# Patient Record
Sex: FEMALE | Race: WHITE | NOT HISPANIC OR LATINO | Employment: PART TIME | URBAN - METROPOLITAN AREA
[De-identification: names, ages, dates, MRNs, and addresses within clinical notes are randomized per-mention and may not be internally consistent; named-entity substitution may affect disease eponyms.]

---

## 2024-04-25 ENCOUNTER — OFFICE VISIT (OUTPATIENT)
Dept: PRIMARY CARE | Facility: CLINIC | Age: 49
End: 2024-04-25
Payer: COMMERCIAL

## 2024-04-25 VITALS
DIASTOLIC BLOOD PRESSURE: 82 MMHG | OXYGEN SATURATION: 96 % | SYSTOLIC BLOOD PRESSURE: 124 MMHG | HEART RATE: 76 BPM | WEIGHT: 195 LBS

## 2024-04-25 DIAGNOSIS — T78.40XA ALLERGY, INITIAL ENCOUNTER: ICD-10-CM

## 2024-04-25 DIAGNOSIS — Z12.31 SCREENING MAMMOGRAM, ENCOUNTER FOR: ICD-10-CM

## 2024-04-25 DIAGNOSIS — Z01.419 ENCOUNTER FOR GYNECOLOGICAL EXAMINATION: ICD-10-CM

## 2024-04-25 DIAGNOSIS — Z00.00 ENCOUNTER FOR ANNUAL PHYSICAL EXAM: Primary | ICD-10-CM

## 2024-04-25 DIAGNOSIS — Z86.19 HISTORY OF EPSTEIN-BARR VIRUS INFECTION: ICD-10-CM

## 2024-04-25 DIAGNOSIS — R06.02 SHORTNESS OF BREATH: ICD-10-CM

## 2024-04-25 DIAGNOSIS — G47.30 SLEEP APNEA, UNSPECIFIED TYPE: ICD-10-CM

## 2024-04-25 DIAGNOSIS — M35.9 AUTOIMMUNE DISEASE (MULTI): ICD-10-CM

## 2024-04-25 DIAGNOSIS — Z12.11 ENCOUNTER FOR SCREENING COLONOSCOPY: ICD-10-CM

## 2024-04-25 DIAGNOSIS — R07.89 OTHER CHEST PAIN: ICD-10-CM

## 2024-04-25 DIAGNOSIS — R10.9 ABDOMINAL PAIN, UNSPECIFIED ABDOMINAL LOCATION: ICD-10-CM

## 2024-04-25 PROCEDURE — 1036F TOBACCO NON-USER: CPT | Performed by: NURSE PRACTITIONER

## 2024-04-25 PROCEDURE — 99204 OFFICE O/P NEW MOD 45 MIN: CPT | Performed by: NURSE PRACTITIONER

## 2024-04-25 ASSESSMENT — ENCOUNTER SYMPTOMS
ACTIVITY CHANGE: 1
CONFUSION: 0
PSYCHIATRIC NEGATIVE: 1
EYES NEGATIVE: 1
JOINT SWELLING: 1
WOUND: 0
NERVOUS/ANXIOUS: 0
APPETITE CHANGE: 1
CHEST TIGHTNESS: 1
LIGHT-HEADEDNESS: 0
FATIGUE: 1
DIZZINESS: 0
NEUROLOGICAL NEGATIVE: 1
PALPITATIONS: 0
POLYPHAGIA: 0
SHORTNESS OF BREATH: 1
ARTHRALGIAS: 1
ENDOCRINE NEGATIVE: 1
FACIAL ASYMMETRY: 0
GASTROINTESTINAL NEGATIVE: 1
WEAKNESS: 0
HEMATOLOGIC/LYMPHATIC NEGATIVE: 1
BACK PAIN: 1
SLEEP DISTURBANCE: 0

## 2024-04-25 ASSESSMENT — PATIENT HEALTH QUESTIONNAIRE - PHQ9
1. LITTLE INTEREST OR PLEASURE IN DOING THINGS: NOT AT ALL
2. FEELING DOWN, DEPRESSED OR HOPELESS: NOT AT ALL
SUM OF ALL RESPONSES TO PHQ9 QUESTIONS 1 AND 2: 0

## 2024-04-25 NOTE — PROGRESS NOTES
Subjective   Patient ID: Quin Antoine is a 49 y.o. female who presents for Establish Care.    HPI Quin is here to b care   States its been years since she's been to a PCP.   Has several complaints   Going t  Excessiveness daytime sleepiness not sleeping well     Past Medical History     · Acute bronchitis (466.0) (J20.9)   · Acute pharyngitis (462) (J02.9)   · Acute URI (465.9) (J06.9)   · Acute viral pharyngitis (462) (J02.8,B97.89)   · Depression (311) (F32.9)   · Fatigue (780.79) (R53.83)   · LBP (low back pain) (724.2) (M54.5)   · Pre-op evaluation (V72.84) (Z01.818)   · Sciatica (724.3) (M54.30)   · Sinusitis (473.9) (J32.9)   · Strep pharyngitis (034.0) (J02.0)         Surgical History     · History of Tonsillectomy     Family History     · Family history of diabetes mellitus    · Family history of Seasonal allergies     · Family history of Seasonal allergies     · Family history of Mental problems   · Family history of Seasonal allergies     Social History     · Alcohol use (V49.89) (Z78.9)   · Caffeine use   ·     · Never a smoker   · No drug use     Review of Systems   Constitutional:  Positive for activity change, appetite change and fatigue.   HENT: Negative.     Eyes: Negative.    Respiratory:  Positive for chest tightness and shortness of breath.    Cardiovascular:  Negative for chest pain and palpitations.   Gastrointestinal: Negative.    Endocrine: Negative.  Negative for polyphagia.   Genitourinary: Negative.    Musculoskeletal:  Positive for arthralgias, back pain and joint swelling.   Skin: Negative.  Negative for pallor, rash and wound.   Allergic/Immunologic: Positive for environmental allergies.   Neurological: Negative.  Negative for dizziness, facial asymmetry, weakness and light-headedness.   Hematological: Negative.    Psychiatric/Behavioral: Negative.  Negative for confusion, sleep disturbance and suicidal ideas. The patient is not nervous/anxious.    All other systems  reviewed and are negative.      Objective   /82   Pulse 76   Wt 88.5 kg (195 lb)   SpO2 96%     Physical Exam  Vitals and nursing note reviewed.   Constitutional:       Appearance: Normal appearance. She is normal weight.   HENT:      Head: Normocephalic.      Nose: Nose normal.      Mouth/Throat:      Mouth: Mucous membranes are moist.   Eyes:      Extraocular Movements: Extraocular movements intact.      Conjunctiva/sclera: Conjunctivae normal.      Pupils: Pupils are equal, round, and reactive to light.   Cardiovascular:      Rate and Rhythm: Normal rate and regular rhythm.      Pulses: Normal pulses.      Heart sounds: Normal heart sounds.   Pulmonary:      Effort: Pulmonary effort is normal.   Abdominal:      General: Abdomen is flat. Bowel sounds are normal.      Palpations: Abdomen is soft.   Musculoskeletal:         General: Normal range of motion.      Cervical back: Normal range of motion.   Skin:     General: Skin is warm and dry.   Neurological:      General: No focal deficit present.      Mental Status: She is alert and oriented to person, place, and time.   Psychiatric:         Mood and Affect: Mood normal.         Behavior: Behavior normal.         Assessment/Plan   1. Encounter for annual physical exam  - Vitamin D 25-Hydroxy,Total (for eval of Vitamin D levels); Future  - CBC and Auto Differential; Future  - Comprehensive Metabolic Panel; Future  - TSH with reflex to Free T4 if abnormal; Future  - Lipid Panel; Future  - Hemoglobin A1C; Future  - T3; Future  - T4; Future  - Microscopic Only, Urine; Future    2. Autoimmune disease (Multi)  - C-Reactive Protein; Future  - Sedimentation Rate; Future  - SEVERO without Reflex MICHELL; Future  - Rheumatoid Factor; Future  - Citrulline Antibody, IgG; Future    3. Sleep apnea, unspecified type  - Home sleep apnea test (HSAT); Future    4. Encounter for screening colonoscopy  - Colonoscopy Screening; Average Risk Patient; Future    5. Screening mammogram,  encounter for    - BI mammo bilateral screening tomosynthesis; Future    6. Other chest pain  - Echocardiogram Stress Test; Future    7. Shortness of breath  - Echocardiogram Stress Test; Future    8. Encounter for gynecological examination  - Referral to Gynecology; Future    9. Allergy, initial encounter    - Respiratory Allergy Profile IgE; Future    10. Abdominal pain, unspecified abdominal location  - Celiac Panel; Future    11. History of Kate-Barr virus infection    - Kate-Barr virus VCA antibody panel; Future        FU in 1 month   Complete testing as ordered   Report to ER for worsening Shortness of breath   Chest pain or trouble staying awake

## 2024-04-25 NOTE — PATIENT INSTRUCTIONS
FU in 1 month   Complete testing as ordered   Report to ER for worsening Shortness of breath   Chest pain or trouble staying awake

## 2024-04-26 ENCOUNTER — HOSPITAL ENCOUNTER (OUTPATIENT)
Dept: CARDIOLOGY | Facility: HOSPITAL | Age: 49
Discharge: HOME | End: 2024-04-26
Payer: COMMERCIAL

## 2024-04-26 ENCOUNTER — LAB (OUTPATIENT)
Dept: LAB | Facility: LAB | Age: 49
End: 2024-04-26
Payer: COMMERCIAL

## 2024-04-26 DIAGNOSIS — R07.9 CHEST PAIN, UNSPECIFIED: ICD-10-CM

## 2024-04-26 DIAGNOSIS — Z00.00 ENCOUNTER FOR ANNUAL PHYSICAL EXAM: ICD-10-CM

## 2024-04-26 DIAGNOSIS — T78.40XA ALLERGY, INITIAL ENCOUNTER: ICD-10-CM

## 2024-04-26 DIAGNOSIS — M35.9 AUTOIMMUNE DISEASE (MULTI): ICD-10-CM

## 2024-04-26 DIAGNOSIS — R06.02 SHORTNESS OF BREATH: ICD-10-CM

## 2024-04-26 DIAGNOSIS — Z86.19 HISTORY OF EPSTEIN-BARR VIRUS INFECTION: ICD-10-CM

## 2024-04-26 DIAGNOSIS — R07.89 OTHER CHEST PAIN: ICD-10-CM

## 2024-04-26 DIAGNOSIS — R10.9 ABDOMINAL PAIN, UNSPECIFIED ABDOMINAL LOCATION: ICD-10-CM

## 2024-04-26 LAB
25(OH)D3 SERPL-MCNC: 48 NG/ML (ref 30–100)
ALBUMIN SERPL BCP-MCNC: 3.9 G/DL (ref 3.4–5)
ALP SERPL-CCNC: 57 U/L (ref 33–110)
ALT SERPL W P-5'-P-CCNC: 20 U/L (ref 7–45)
ANION GAP SERPL CALC-SCNC: 11 MMOL/L (ref 10–20)
AST SERPL W P-5'-P-CCNC: 16 U/L (ref 9–39)
BASOPHILS # BLD AUTO: 0.05 X10*3/UL (ref 0–0.1)
BASOPHILS NFR BLD AUTO: 0.5 %
BILIRUB SERPL-MCNC: 0.6 MG/DL (ref 0–1.2)
BUN SERPL-MCNC: 10 MG/DL (ref 6–23)
CALCIUM SERPL-MCNC: 8.8 MG/DL (ref 8.6–10.6)
CCP IGG SERPL-ACNC: <1 U/ML
CHLORIDE SERPL-SCNC: 103 MMOL/L (ref 98–107)
CHOLEST SERPL-MCNC: 216 MG/DL (ref 0–199)
CHOLESTEROL/HDL RATIO: 3.9
CO2 SERPL-SCNC: 27 MMOL/L (ref 21–32)
CREAT SERPL-MCNC: 0.63 MG/DL (ref 0.5–1.05)
CRP SERPL-MCNC: 0.45 MG/DL
EBV EA IGG SER QL: NEGATIVE
EBV NA AB SER QL: POSITIVE
EBV VCA IGG SER IA-ACNC: POSITIVE
EBV VCA IGM SER IA-ACNC: NEGATIVE
EGFRCR SERPLBLD CKD-EPI 2021: >90 ML/MIN/1.73M*2
EOSINOPHIL # BLD AUTO: 0.21 X10*3/UL (ref 0–0.7)
EOSINOPHIL NFR BLD AUTO: 2.2 %
ERYTHROCYTE [DISTWIDTH] IN BLOOD BY AUTOMATED COUNT: 13.2 % (ref 11.5–14.5)
ERYTHROCYTE [SEDIMENTATION RATE] IN BLOOD BY WESTERGREN METHOD: 10 MM/H (ref 0–20)
EST. AVERAGE GLUCOSE BLD GHB EST-MCNC: 105 MG/DL
GLIADIN PEPTIDE IGA SER IA-ACNC: <1 U/ML
GLUCOSE SERPL-MCNC: 86 MG/DL (ref 74–99)
HBA1C MFR BLD: 5.3 %
HCT VFR BLD AUTO: 43.1 % (ref 36–46)
HDLC SERPL-MCNC: 56 MG/DL
HGB BLD-MCNC: 14.2 G/DL (ref 12–16)
IMM GRANULOCYTES # BLD AUTO: 0.02 X10*3/UL (ref 0–0.7)
IMM GRANULOCYTES NFR BLD AUTO: 0.2 % (ref 0–0.9)
LDLC SERPL CALC-MCNC: 104 MG/DL
LYMPHOCYTES # BLD AUTO: 1.95 X10*3/UL (ref 1.2–4.8)
LYMPHOCYTES NFR BLD AUTO: 20.2 %
MCH RBC QN AUTO: 29.9 PG (ref 26–34)
MCHC RBC AUTO-ENTMCNC: 32.9 G/DL (ref 32–36)
MCV RBC AUTO: 91 FL (ref 80–100)
MONOCYTES # BLD AUTO: 0.64 X10*3/UL (ref 0.1–1)
MONOCYTES NFR BLD AUTO: 6.6 %
MUCOUS THREADS #/AREA URNS AUTO: NORMAL /LPF
NEUTROPHILS # BLD AUTO: 6.79 X10*3/UL (ref 1.2–7.7)
NEUTROPHILS NFR BLD AUTO: 70.3 %
NON HDL CHOLESTEROL: 160 MG/DL (ref 0–149)
NRBC BLD-RTO: 0 /100 WBCS (ref 0–0)
PLATELET # BLD AUTO: 343 X10*3/UL (ref 150–450)
POTASSIUM SERPL-SCNC: 4.3 MMOL/L (ref 3.5–5.3)
PROT SERPL-MCNC: 6.5 G/DL (ref 6.4–8.2)
RBC # BLD AUTO: 4.75 X10*6/UL (ref 4–5.2)
RBC #/AREA URNS AUTO: NORMAL /HPF
RHEUMATOID FACT SER NEPH-ACNC: <10 IU/ML (ref 0–15)
SODIUM SERPL-SCNC: 137 MMOL/L (ref 136–145)
SQUAMOUS #/AREA URNS AUTO: NORMAL /HPF
T3 SERPL-MCNC: 97 NG/DL (ref 60–200)
T4 SERPL-MCNC: 6.3 UG/DL (ref 4.5–11.1)
TRIGL SERPL-MCNC: 278 MG/DL (ref 0–149)
TSH SERPL-ACNC: 1.38 MIU/L (ref 0.44–3.98)
TTG IGA SER IA-ACNC: <1 U/ML
VLDL: 56 MG/DL (ref 0–40)
WBC # BLD AUTO: 9.7 X10*3/UL (ref 4.4–11.3)
WBC #/AREA URNS AUTO: NORMAL /HPF

## 2024-04-26 PROCEDURE — 80061 LIPID PANEL: CPT

## 2024-04-26 PROCEDURE — 86664 EPSTEIN-BARR NUCLEAR ANTIGEN: CPT

## 2024-04-26 PROCEDURE — 81001 URINALYSIS AUTO W/SCOPE: CPT

## 2024-04-26 PROCEDURE — 86038 ANTINUCLEAR ANTIBODIES: CPT

## 2024-04-26 PROCEDURE — 85652 RBC SED RATE AUTOMATED: CPT

## 2024-04-26 PROCEDURE — 93016 CV STRESS TEST SUPVJ ONLY: CPT | Performed by: INTERNAL MEDICINE

## 2024-04-26 PROCEDURE — 36415 COLL VENOUS BLD VENIPUNCTURE: CPT

## 2024-04-26 PROCEDURE — 86140 C-REACTIVE PROTEIN: CPT

## 2024-04-26 PROCEDURE — 93018 CV STRESS TEST I&R ONLY: CPT | Performed by: INTERNAL MEDICINE

## 2024-04-26 PROCEDURE — 82785 ASSAY OF IGE: CPT

## 2024-04-26 PROCEDURE — 83036 HEMOGLOBIN GLYCOSYLATED A1C: CPT

## 2024-04-26 PROCEDURE — 93350 STRESS TTE ONLY: CPT | Performed by: INTERNAL MEDICINE

## 2024-04-26 PROCEDURE — 80053 COMPREHEN METABOLIC PANEL: CPT

## 2024-04-26 PROCEDURE — 86431 RHEUMATOID FACTOR QUANT: CPT

## 2024-04-26 PROCEDURE — 86003 ALLG SPEC IGE CRUDE XTRC EA: CPT

## 2024-04-26 PROCEDURE — 83516 IMMUNOASSAY NONANTIBODY: CPT

## 2024-04-26 PROCEDURE — 85025 COMPLETE CBC W/AUTO DIFF WBC: CPT

## 2024-04-26 PROCEDURE — 82306 VITAMIN D 25 HYDROXY: CPT

## 2024-04-26 PROCEDURE — 93017 CV STRESS TEST TRACING ONLY: CPT

## 2024-04-26 PROCEDURE — 84436 ASSAY OF TOTAL THYROXINE: CPT

## 2024-04-26 PROCEDURE — 84443 ASSAY THYROID STIM HORMONE: CPT

## 2024-04-26 PROCEDURE — 84480 ASSAY TRIIODOTHYRONINE (T3): CPT

## 2024-04-26 PROCEDURE — 86663 EPSTEIN-BARR ANTIBODY: CPT

## 2024-04-26 PROCEDURE — 86200 CCP ANTIBODY: CPT

## 2024-04-26 PROCEDURE — 86665 EPSTEIN-BARR CAPSID VCA: CPT

## 2024-04-27 LAB
A ALTERNATA IGE QN: <0.1 KU/L
A FUMIGATUS IGE QN: <0.1 KU/L
BERMUDA GRASS IGE QN: 0.33 KU/L
BOXELDER IGE QN: 0.31 KU/L
C HERBARUM IGE QN: <0.1 KU/L
CALIF WALNUT POLN IGE QN: 0.29 KU/L
CAT DANDER IGE QN: 0.15 KU/L
CMN PIGWEED IGE QN: 0.25 KU/L
COMMON RAGWEED IGE QN: 0.61 KU/L
COTTONWOOD IGE QN: 0.34 KU/L
D FARINAE IGE QN: 4.77 KU/L
D PTERONYSS IGE QN: 3.59 KU/L
DOG DANDER IGE QN: <0.1 KU/L
ENGL PLANTAIN IGE QN: 0.27 KU/L
GLIADIN PEPTIDE IGG SER IA-ACNC: 5.14 FLU (ref 0–4.99)
GOOSEFOOT IGE QN: 0.33 KU/L
JOHNSON GRASS IGE QN: 0.32 KU/L
KENT BLUE GRASS IGE QN: 2.78 KU/L
LONDON PLANE IGE QN: 0.31 KU/L
MT JUNIPER IGE QN: 0.25 KU/L
P NOTATUM IGE QN: <0.1 KU/L
PECAN/HICK TREE IGE QN: 0.4 KU/L
ROACH IGE QN: 0.36 KU/L
SALTWORT IGE QN: 0.41 KU/L
SHEEP SORREL IGE QN: 0.28 KU/L
SILVER BIRCH IGE QN: 0.22 KU/L
TIMOTHY IGE QN: 3.02 KU/L
TOTAL IGE SMQN RAST: 241 KU/L
TTG IGG SER IA-ACNC: <0.82 FLU (ref 0–4.99)
WHITE ASH IGE QN: 0.33 KU/L
WHITE ELM IGE QN: 0.36 KU/L
WHITE MULBERRY IGE QN: 0.2 KU/L
WHITE OAK IGE QN: 0.26 KU/L

## 2024-04-29 LAB — ANA SER QL HEP2 SUBST: NEGATIVE

## 2024-05-03 ENCOUNTER — HOSPITAL ENCOUNTER (OUTPATIENT)
Dept: RADIOLOGY | Facility: CLINIC | Age: 49
Discharge: HOME | End: 2024-05-03
Payer: COMMERCIAL

## 2024-05-03 ENCOUNTER — OFFICE VISIT (OUTPATIENT)
Dept: PRIMARY CARE | Facility: CLINIC | Age: 49
End: 2024-05-03
Payer: COMMERCIAL

## 2024-05-03 VITALS
BODY MASS INDEX: 29.95 KG/M2 | HEART RATE: 76 BPM | WEIGHT: 180 LBS | SYSTOLIC BLOOD PRESSURE: 118 MMHG | OXYGEN SATURATION: 96 % | DIASTOLIC BLOOD PRESSURE: 62 MMHG

## 2024-05-03 VITALS — HEIGHT: 65 IN | BODY MASS INDEX: 29.99 KG/M2 | WEIGHT: 180 LBS

## 2024-05-03 DIAGNOSIS — Z13.79 ASHKENAZI JEWISH ANCESTRY REQUIRING POPULATION-SPECIFIC GENETIC SCREENING: ICD-10-CM

## 2024-05-03 DIAGNOSIS — T78.40XA ALLERGY, INITIAL ENCOUNTER: ICD-10-CM

## 2024-05-03 DIAGNOSIS — Z86.19 HISTORY OF EPSTEIN-BARR VIRUS INFECTION: ICD-10-CM

## 2024-05-03 DIAGNOSIS — D89.89 AUTOIMMUNE DISORDER (MULTI): ICD-10-CM

## 2024-05-03 DIAGNOSIS — Z12.31 SCREENING MAMMOGRAM, ENCOUNTER FOR: ICD-10-CM

## 2024-05-03 DIAGNOSIS — K90.0 CELIAC DISEASE (HHS-HCC): Primary | ICD-10-CM

## 2024-05-03 PROCEDURE — 77063 BREAST TOMOSYNTHESIS BI: CPT | Performed by: STUDENT IN AN ORGANIZED HEALTH CARE EDUCATION/TRAINING PROGRAM

## 2024-05-03 PROCEDURE — 99212 OFFICE O/P EST SF 10 MIN: CPT | Performed by: NURSE PRACTITIONER

## 2024-05-03 PROCEDURE — 1036F TOBACCO NON-USER: CPT | Performed by: NURSE PRACTITIONER

## 2024-05-03 PROCEDURE — 77067 SCR MAMMO BI INCL CAD: CPT | Performed by: STUDENT IN AN ORGANIZED HEALTH CARE EDUCATION/TRAINING PROGRAM

## 2024-05-03 PROCEDURE — 77067 SCR MAMMO BI INCL CAD: CPT

## 2024-05-03 RX ORDER — MONTELUKAST SODIUM 10 MG/1
10 TABLET ORAL NIGHTLY
Qty: 30 TABLET | Refills: 5 | Status: SHIPPED | OUTPATIENT
Start: 2024-05-03 | End: 2024-05-06 | Stop reason: SDUPTHER

## 2024-05-03 RX ORDER — AZELASTINE 1 MG/ML
1 SPRAY, METERED NASAL 2 TIMES DAILY
Qty: 30 ML | Refills: 12 | Status: SHIPPED | OUTPATIENT
Start: 2024-05-03 | End: 2024-05-06 | Stop reason: SDUPTHER

## 2024-05-03 ASSESSMENT — PATIENT HEALTH QUESTIONNAIRE - PHQ9
1. LITTLE INTEREST OR PLEASURE IN DOING THINGS: NOT AT ALL
SUM OF ALL RESPONSES TO PHQ9 QUESTIONS 1 AND 2: 0
2. FEELING DOWN, DEPRESSED OR HOPELESS: NOT AT ALL

## 2024-05-03 ASSESSMENT — ENCOUNTER SYMPTOMS
DEPRESSION: 0
RHINORRHEA: 1
SINUS PAIN: 1

## 2024-05-03 NOTE — PROGRESS NOTES
Subjective   Patient ID: Quin Antoine is a 49 y.o. female who presents for Follow-up (BW RESULTS).    HPI   Quin presents to clinic for blood work FU       Review of Systems   HENT:  Positive for congestion, postnasal drip, rhinorrhea and sinus pain.        Objective   /62 (BP Location: Left arm, Patient Position: Sitting, BP Cuff Size: Large adult)   Pulse 76   Wt 81.6 kg (180 lb)   LMP 04/03/2024 (Approximate)   SpO2 96%   BMI 29.95 kg/m²     Physical Exam  Vitals and nursing note reviewed.   Constitutional:       Appearance: Normal appearance. She is normal weight.   HENT:      Head: Normocephalic.      Nose: Nose normal.      Mouth/Throat:      Mouth: Mucous membranes are moist.   Eyes:      Extraocular Movements: Extraocular movements intact.      Conjunctiva/sclera: Conjunctivae normal.      Pupils: Pupils are equal, round, and reactive to light.   Cardiovascular:      Rate and Rhythm: Normal rate and regular rhythm.      Pulses: Normal pulses.      Heart sounds: Normal heart sounds.   Pulmonary:      Effort: Pulmonary effort is normal.   Abdominal:      General: Abdomen is flat. Bowel sounds are normal.      Palpations: Abdomen is soft.   Musculoskeletal:         General: Normal range of motion.      Cervical back: Normal range of motion.   Skin:     General: Skin is warm and dry.   Neurological:      General: No focal deficit present.      Mental Status: She is alert and oriented to person, place, and time.   Psychiatric:         Mood and Affect: Mood normal.         Behavior: Behavior normal.         Assessment/Plan   1. Celiac disease ([facility])  - Referral to Gastroenterology; Future    2. Allergy, initial encounter    - azelastine (Astelin) 137 mcg (0.1 %) nasal spray; Administer 1 spray into each nostril 2 times a day. Use in each nostril as directed  Dispense: 30 mL; Refill: 12  - montelukast (Singulair) 10 mg tablet; Take 1 tablet (10 mg) by mouth once daily at bedtime.  Dispense: 30  tablet; Refill: 5  - Referral to Allergy; Future    3. 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 History of Kate-Barr virus infection  - Referral to Infectious Disease; Future    5. Autoimmune disorder (Multi)    - Lupus Anticoagulant With Interpretation [BARRON]; Future       FU as needed

## 2024-05-06 DIAGNOSIS — T78.40XA ALLERGY, INITIAL ENCOUNTER: ICD-10-CM

## 2024-05-06 RX ORDER — MONTELUKAST SODIUM 10 MG/1
10 TABLET ORAL NIGHTLY
Qty: 30 TABLET | Refills: 5 | Status: SHIPPED | OUTPATIENT
Start: 2024-05-06 | End: 2024-11-02

## 2024-05-06 RX ORDER — AZELASTINE 1 MG/ML
1 SPRAY, METERED NASAL 2 TIMES DAILY
Qty: 30 ML | Refills: 12 | Status: SHIPPED | OUTPATIENT
Start: 2024-05-06 | End: 2025-05-06

## 2024-05-07 ENCOUNTER — LAB (OUTPATIENT)
Dept: LAB | Facility: LAB | Age: 49
End: 2024-05-07
Payer: COMMERCIAL

## 2024-05-07 DIAGNOSIS — D89.89 AUTOIMMUNE DISORDER (MULTI): ICD-10-CM

## 2024-05-07 DIAGNOSIS — Z12.11 COLON CANCER SCREENING: Primary | ICD-10-CM

## 2024-05-07 PROCEDURE — 85613 RUSSELL VIPER VENOM DILUTED: CPT

## 2024-05-07 PROCEDURE — 36415 COLL VENOUS BLD VENIPUNCTURE: CPT

## 2024-05-07 PROCEDURE — 85730 THROMBOPLASTIN TIME PARTIAL: CPT

## 2024-05-07 RX ORDER — SODIUM, POTASSIUM,MAG SULFATES 17.5-3.13G
1 SOLUTION, RECONSTITUTED, ORAL ORAL 2 TIMES DAILY
Qty: 354 ML | Refills: 0 | Status: SHIPPED | OUTPATIENT
Start: 2024-05-07

## 2024-05-09 DIAGNOSIS — R92.8 ABNORMAL MAMMOGRAM: Primary | ICD-10-CM

## 2024-05-09 LAB
DRVVT SCREEN TO CONFIRM RATIO: 0.97 RATIO
DRVVT/DRVVT CFM NRMLZD PPP-RTO: 0.99 RATIO
DRVVT/DRVVT CFM P DOAC NEUT NORM PPP-RTO: 0.98 RATIO
LA 2 SCREEN W REFLEX-IMP: NORMAL
NORMALIZED SCT PPP-RTO: 0.92 RATIO
SILICA CLOTTING TIME CONFIRMATION: 0.93 RATIO
SILICA CLOTTING TIME SCREEN: 0.85 RATIO

## 2024-05-20 NOTE — PROGRESS NOTES
[facility] Infectious Diseases Consultation Note    Date of Consultation/Follow-up: 5/22/2024  Primary MD: Lazara Anton, APRN-CNP    Reason For Consult: Positive EBV serologies    History Of Present Illness  Quin Antoine is a 49 y.o. female with allergic rhinitis, LBP, and history of EBV infection ~2014 who was referred for positive EBV serologies.    To review her medical history, Ms. Antoine reports frequent miscarriages (9 if I am recalling correctly) in the late 2000s/early 2010s. She developed notable fatigue and tiredness after the birth of her daughter (now 17); she had a particularly significant episode of fatigue with ear pain, with work-up around including positive EBV serologies around 2013 or so (I cannot find this in the chart, which I suspect is due to our recent EMR change here at ). She notes an intermittent history of sore throat around her early 20s that seemed to resolve.     At one point, her TSH was tested at  and was remarkably low; she was not alerted of this however. She sought care with holistic medical approaches due to her refractory symptoms, and she has been taking supplements with response in some of her symptoms. Testing has included hair testing, which she notes found a number of mineral and vitamin deficiencies, which were also noted among her children as well.    She recently established care with  in 4/2024. She had noted some joint swelling at that visit as well. Work-up included CBC (normal, including differential), CMP (normal), ESR, CRP, SEVERO/RF/CCP, TSH/T4, A1c, and vitamin D. She had a positive gliadin Ab, nearly pan-positive IgE panel, and positive EBV serologies (EBV VCA IgG positive, EBV Nuclear Ag positive; negative VCA IgM and early antigen IgG).    Ms. Antoine reports the above history without fevers; she has gained weight (now stabilized) in setting of prior hypothyroidism. She has abdominal discomfort as well as diffuse intermittent arthralgias,  besides bilateral muscle tightness/myalgias. She lastly notes a recent history of voice changes and pain with singing/loud talking, which is a new development for her.    Past Medical History  She has a past medical history of Allergy, unspecified, initial encounter.    Surgical History  She has a past surgical history that includes Tonsillectomy (09/18/2014).     Social History     Occupational History    Not on file   Tobacco Use    Smoking status: Never     Passive exposure: Never    Smokeless tobacco: Never   Vaping Use    Vaping status: Never Used   Substance and Sexual Activity    Alcohol use: Not on file    Drug use: Not on file    Sexual activity: Not on file     Travel History   Travel since 04/22/24    No documented travel since 04/22/24            Family History  No family history on file.  Allergies  Patient has no known allergies.     Immunization History   Administered Date(s) Administered    Pfizer Purple Cap SARS-CoV-2 11/03/2021     Medications  Current Outpatient Medications on File Prior to Visit   Medication Sig Dispense Refill    azelastine (Astelin) 137 mcg (0.1 %) nasal spray Administer 1 spray into each nostril 2 times a day. Use in each nostril as directed 30 mL 12    calcium citrate/vitamin D3 (CITRACAL-D3 PETITES ORAL) Take by mouth.      docosahexaenoic acid/epa (FISH OIL ORAL) Take 565 mg by mouth 2 times a day.      enzymes,digestive (DIGESTIVE ENZYMES ORAL) Take by mouth.      magnesium carb,citrate,oxide (MAGNESIUM COMPLEX ORAL) Take by mouth.      montelukast (Singulair) 10 mg tablet Take 1 tablet (10 mg) by mouth once daily at bedtime. 30 tablet 5    mv-min/folic/vit K/lycop/coQ10 (DAILY MULTIVITAMIN ORAL) Take 1 capsule by mouth once daily. For Celiac disease      NON FORMULARY [place] mon[place]      sodium,potassium,mag sulfates (Suprep) 17.5-3.13-1.6 gram recon soln solution Take 1 bottle by mouth 2 times a day. Take one bottle twice as directed by the prep instructions 354 mL 0     No  current facility-administered medications on file prior to visit.         Review of Systems     Objective  Range of Vitals (last 24 hours)  Vitals:    05/22/24 1350   BP: 129/87   Pulse: 91   Temp: 36.8 °C (98.2 °F)       Daily Weight  05/22/24 : 88 kg (194 lb)    Body mass index is 32.28 kg/m².     Physical Exam  GENERAL APPEARANCE: Awake, alert, and not in any distress.  HEENT: Atraumatic and normocephalic. EOM grossly intact. Anicteric sclera without conjunctival injection. No OP erythema or exudate appreciated. No cervical LAD appreciated  THROAT: No ulcers or thrush  NECK: Supple  CARDIAC: Regular, appropriate rate. No murmurs appreciated today.  LUNGS: CTAB without crackles, wheezing.  ABDOMEN: Soft, NT throughout. No hepatosplenomegaly appreciated.  MUSCULOSKELETAL: No swelling of major joints  EXTREMITIES: No edema  NEUROLOGICAL: Well oriented and answers appropriately  SKIN: Pinpoint, almost follicular erythematous lesions along bilateral upper extremities.       Relevant Results  Labs  Reviewed in Epic. Notable for:  -04/26/24 EBV serologies: positive EBV VCA IgG and Nuclear Antigen IgG; negative VCA IgM and Early antigen IgG  -04/26/24 Celiac panel: positive deamidated gliadian IgG  -04/26/24 IgE panel: many positives, negative for Aspergillus and penicillium    Assessment/Plan  Quin Antoine is a 49 y.o. female with allergic rhinitis, LBP, and history of EBV infection ~2014 who was referred for positive EBV serologies in setting of profound fatigue    Her EBV panel is consistent with prior infection with EBV rather than recent, which matches with her history (tested positive for EBV exposure ~2013 if not earlier). We discussed that EBV serologies measure our bodies' immunologic response to EBV rather than EBV itself. As such, I expect these antibodies to remain in her circulation for the foreseeable future. She has no clinical signs of chronic active EBV infection, which is exceptionally rare and  follows a course more consistent with cancer (with cytopenias, hepatosplenomegaly, etc) than infection; treatment for that is essentially stem cell transplant. We discussed antivirals are not considered effective for EBV in the long-run (especially for latent infection), since EBV takes over human transcription machinery and our antiviral therapies work against EBV-specific machinery.     EBV can be associated with protracted fatigue for several months, and I would not doubt that some individuals have protracted symptoms for much longer. On the other hand, I much more commonly have seen individuals find other causes for their fatigue, so I whole-heartedly agree with evaluation of her other causes of fatigue, abdominal symptoms, and joint symptoms (related to ?celiac panel positivity, allergies [maybe food allergy component?], and/or JOANA among others currently being explored).    Plan:  -EBV serologies consistent with prior/remote infection; no current signs of (exceptionally rare) chronic active EBV infection (effectively a malignancy) fortunately  -Agree with work-up for other causes of her fatigue, including her celiac panel, allergies, and/or JOANA  -Referred to ENT for evaluation of her voice changes and increasing pain with singing/talking as requested  -Provided with our clinic number should questions arise    I spent 60 minutes in the care of this patient, including chart review, patient interview/exam, and documentation.    Chaka Aldridge MD

## 2024-05-22 ENCOUNTER — OFFICE VISIT (OUTPATIENT)
Dept: INFECTIOUS DISEASES | Facility: CLINIC | Age: 49
End: 2024-05-22
Payer: COMMERCIAL

## 2024-05-22 ENCOUNTER — ANESTHESIA EVENT (OUTPATIENT)
Dept: GASTROENTEROLOGY | Facility: EXTERNAL LOCATION | Age: 49
End: 2024-05-22

## 2024-05-22 VITALS
SYSTOLIC BLOOD PRESSURE: 129 MMHG | DIASTOLIC BLOOD PRESSURE: 87 MMHG | TEMPERATURE: 98.2 F | WEIGHT: 194 LBS | BODY MASS INDEX: 32.32 KG/M2 | HEART RATE: 91 BPM | HEIGHT: 65 IN

## 2024-05-22 DIAGNOSIS — R49.0 VOICE HOARSENESS: Primary | ICD-10-CM

## 2024-05-22 DIAGNOSIS — Z86.19 HISTORY OF EPSTEIN-BARR VIRUS INFECTION: ICD-10-CM

## 2024-05-22 PROCEDURE — 1036F TOBACCO NON-USER: CPT | Performed by: STUDENT IN AN ORGANIZED HEALTH CARE EDUCATION/TRAINING PROGRAM

## 2024-05-22 PROCEDURE — 99205 OFFICE O/P NEW HI 60 MIN: CPT | Performed by: STUDENT IN AN ORGANIZED HEALTH CARE EDUCATION/TRAINING PROGRAM

## 2024-05-22 NOTE — LETTER
05/22/24    Lazara Anton, APRN-CNP  [address] [facility] [address]      Dear Dr. Lazara Anton, APRN-CNP,    I am writing to confirm that your patient, Quin Antoine, received care in my office on 05/22/24. I have enclosed a summary of the care provided to Quin for your reference.    Please contact me with any questions you may have regarding the visit.    Sincerely,         Chaka Aldridge MD  [address]  [facility] [id number]    CC: No Recipients

## 2024-05-22 NOTE — LETTER
05/22/24    Lazara Anton, APRN-CNP  [address] [facility] [address]      Dear Dr. Lazara Anton, APRN-CNP,    Thank you for referring your patient, Quin Antoine, to receive care in my office. I have enclosed a summary of the care provided to Quin on 05/22/24.    Please contact me with any questions you may have regarding the visit.    Sincerely,         Chaka Aldridge MD  [address]  [facility] [id number]    CC: No Recipients

## 2024-05-22 NOTE — PATIENT INSTRUCTIONS
Please continue work-up for the positive testing for Celiac's disease and allergies as well. Please contact our office at [phone] if any questions arise.

## 2024-06-04 ENCOUNTER — ANESTHESIA EVENT (OUTPATIENT)
Dept: GASTROENTEROLOGY | Facility: EXTERNAL LOCATION | Age: 49
End: 2024-06-04

## 2024-06-05 ENCOUNTER — ANESTHESIA (OUTPATIENT)
Dept: GASTROENTEROLOGY | Facility: EXTERNAL LOCATION | Age: 49
End: 2024-06-05

## 2024-06-05 ENCOUNTER — APPOINTMENT (OUTPATIENT)
Dept: GASTROENTEROLOGY | Facility: EXTERNAL LOCATION | Age: 49
End: 2024-06-05
Payer: COMMERCIAL

## 2024-06-06 ENCOUNTER — HOSPITAL ENCOUNTER (OUTPATIENT)
Dept: RADIOLOGY | Facility: CLINIC | Age: 49
Discharge: HOME | End: 2024-06-06
Payer: COMMERCIAL

## 2024-06-06 DIAGNOSIS — R92.8 OTHER ABNORMAL AND INCONCLUSIVE FINDINGS ON DIAGNOSTIC IMAGING OF BREAST: ICD-10-CM

## 2024-06-06 DIAGNOSIS — R92.8 ABNORMAL MAMMOGRAM: ICD-10-CM

## 2024-06-06 PROCEDURE — 77061 BREAST TOMOSYNTHESIS UNI: CPT | Mod: RIGHT SIDE | Performed by: RADIOLOGY

## 2024-06-06 PROCEDURE — 77065 DX MAMMO INCL CAD UNI: CPT | Mod: RIGHT SIDE | Performed by: RADIOLOGY

## 2024-06-06 PROCEDURE — 77061 BREAST TOMOSYNTHESIS UNI: CPT | Mod: RT

## 2024-06-10 ENCOUNTER — ANESTHESIA (OUTPATIENT)
Dept: GASTROENTEROLOGY | Facility: EXTERNAL LOCATION | Age: 49
End: 2024-06-10

## 2024-06-10 ENCOUNTER — APPOINTMENT (OUTPATIENT)
Dept: GASTROENTEROLOGY | Facility: EXTERNAL LOCATION | Age: 49
End: 2024-06-10
Payer: COMMERCIAL

## 2024-06-19 ENCOUNTER — APPOINTMENT (OUTPATIENT)
Dept: OTOLARYNGOLOGY | Facility: CLINIC | Age: 49
End: 2024-06-19
Payer: COMMERCIAL

## 2024-06-19 VITALS
HEIGHT: 65 IN | SYSTOLIC BLOOD PRESSURE: 115 MMHG | DIASTOLIC BLOOD PRESSURE: 81 MMHG | BODY MASS INDEX: 32.28 KG/M2 | TEMPERATURE: 97.4 F

## 2024-06-19 DIAGNOSIS — K21.9 LARYNGOPHARYNGEAL REFLUX (LPR): Primary | ICD-10-CM

## 2024-06-19 DIAGNOSIS — R49.0 HOARSENESS: ICD-10-CM

## 2024-06-19 DIAGNOSIS — J38.3 VOCAL CORD STRAIN: ICD-10-CM

## 2024-06-19 PROCEDURE — 31575 DIAGNOSTIC LARYNGOSCOPY: CPT | Performed by: OTOLARYNGOLOGY

## 2024-06-19 PROCEDURE — 1036F TOBACCO NON-USER: CPT | Performed by: OTOLARYNGOLOGY

## 2024-06-19 PROCEDURE — 99204 OFFICE O/P NEW MOD 45 MIN: CPT | Performed by: OTOLARYNGOLOGY

## 2024-06-19 RX ORDER — OMEPRAZOLE 20 MG/1
CAPSULE, DELAYED RELEASE ORAL
Qty: 90 CAPSULE | Refills: 3 | Status: SHIPPED | OUTPATIENT
Start: 2024-06-19

## 2024-06-19 NOTE — PROGRESS NOTES
Impression:  1. Laryngopharyngeal reflux (LPR)  omeprazole (PriLOSEC) 20 mg DR capsule      2. Hoarseness  Referral to ENT      3. Vocal cord strain             RECOMMENDATIONS/PLAN :  I explained to the patient that she does have swelling along the back of her larynx consistent with silent reflux/laryngopharyngeal reflux.  We will place her on omeprazole 20 mg daily for the next 6 months.  She will continue to drink plenty of water and avoid excessive caffeine.  I would also like to refer her to speech therapy to help with her vocal strain.      **This electronic medical record note was created with the use of voice recognition software.  Despite proofreading, typographical or grammatical errors may be present that could affect meaning of content **    Subjective   Patient ID:     Quni Antoine is a 49 y.o. female who presents to the office today complaining of discomfort along her vocal cords that she has had her entire life.  She states that she sings frequently and afterwards her vocal cords feel strained and she is uncomfortable.  She denies any hemoptysis or hematemesis.  She denies true heartburn however she is being evaluated for celiac's disease and she is starting to have more reflux issues.  She denies choking on foods or liquids.  She is constantly clearing her throat.    ROS:  A detailed 12 system review of systems is noted on the intake form has been reviewed with the patient with details noted in the HPI and scanned into the patient's medical record.    Objective     Past Medical History:   Diagnosis Date    Allergy, unspecified, initial encounter     Allergic        Past Surgical History:   Procedure Laterality Date    TONSILLECTOMY  09/18/2014    Tonsillectomy        No Known Allergies       Current Outpatient Medications:     azelastine (Astelin) 137 mcg (0.1 %) nasal spray, Administer 1 spray into each nostril 2 times a day. Use in each nostril as directed, Disp: 30 mL, Rfl: 12    calcium  "citrate/vitamin D3 (CITRACAL-D3 PETITES ORAL), Take by mouth., Disp: , Rfl:     docosahexaenoic acid/epa (FISH OIL ORAL), Take 565 mg by mouth 2 times a day., Disp: , Rfl:     enzymes,digestive (DIGESTIVE ENZYMES ORAL), Take by mouth., Disp: , Rfl:     magnesium carb,citrate,oxide (MAGNESIUM COMPLEX ORAL), Take by mouth., Disp: , Rfl:     montelukast (Singulair) 10 mg tablet, Take 1 tablet (10 mg) by mouth once daily at bedtime., Disp: 30 tablet, Rfl: 5    mv-min/folic/vit K/lycop/coQ10 (DAILY MULTIVITAMIN ORAL), Take 1 capsule by mouth once daily. For Celiac disease, Disp: , Rfl:     NON FORMULARY, [place], Disp: , Rfl:     sodium,potassium,mag sulfates (Suprep) 17.5-3.13-1.6 gram recon soln solution, Take 1 bottle by mouth 2 times a day. Take one bottle twice as directed by the prep instructions, Disp: 354 mL, Rfl: 0    omeprazole (PriLOSEC) 20 mg DR capsule, 20 mg by mouth daily, Disp: 90 capsule, Rfl: 3     Tobacco Use: Low Risk  (6/19/2024)    Patient History     Smoking Tobacco Use: Never     Smokeless Tobacco Use: Never     Passive Exposure: Never        Alcohol Use: Not on file        Social History     Substance and Sexual Activity   Drug Use Not on file        Physical Exam:  Visit Vitals  /81   Temp 36.3 °C (97.4 °F) (Temporal)   Ht 1.651 m (5' 5\")   BMI 32.28 kg/m²   OB Status Having periods   Smoking Status Never   BSA 2.01 m²      General: Patient is alert, oriented, cooperative in no apparent distress.  Head: Normocephalic, atraumatic.  Eyes: PERRL, EOMI, Conjunctiva is clear. No nystagmus.  Ears: Right Ear-- Pinna is normal.  External auditory canal is patent. Tympanic membrane is [intact, translucent and has good mobility with my pneumatic otoscope. No effusion].  Mastoid is nontender.  Left ear-- Pinna is normal.  External auditory canal is patent. Tympanic membrane is [intact, translucent and has good mobility with my pneumatic otoscope.  No effusion].  Mastoid is nontender.  Nose: Septum " is mildly deviated to the left.  No septal perforation or lesions. No septal hematoma/ seroma.  No signs of bleeding.  Inferior turbinates are mildly swollen.   No evidence of intranasal polyps.  No infectious drainage.  Throat:  Floor of mouth is clear, no masses.  Tongue appears normal, no lesions or masses. Gums, gingiva, buccal mucosa appear pink and moist, no lesions. Teeth are in good repair.  No obvious dental infections.  Peritonsillar regions appear symmetric without swelling.  Hard and soft palate appear normal, no obvious cleft. Uvula is midline.  Oropharynx: No lesions. Retropharyngeal wall is flat.  No active postnasal drip.  Neck: Supple,  no lymphadenopathy.  No masses.  Salivary Glands: Symmetric bilaterally.  No palpable masses.  No evidence of acute infection or salivary stones  Neurologic: Cranial Nerves 2-12 are grossly intact without focal deficits. Cerebellar function testing is normal.     Results:   []    Procedure:   Pre Op Diagnosis: Persistent hoarseness with vocal cord strain-rule out vocal cord nodules  Post Op Diagnosis: Same  Procedure: Flexible Nasopharyngolaryngoscopy  Surgeon: Kaleb Storm DO  Assist: None  Anesthesia: Topical Lidocaine 4%/ 0.05% Afrin 1:1 mixture  EBL: None  Complications: None  Disposition: The patient tolerated the procedure well. There were no complications.    Procedure:  After informed consent was obtained with the risks, benefits, complications and alternatives explained to the patient/ guardian, the patient was sat up in the ENT chair and the nose was anesthetized and decongested with topical  4% lidocaine and 0.05% Afrin. After allowing this to take effect, the flexible nasopharyngoscope was advanced thru the nostrils and down to the larynx. The following areas were visualized:  The nasal passages, septum, nasopharynx, sinus ostia, base of tongue, epiglottis, true and false vocal folds, arytenoids, post cricoid region and subglottis were all examined and  found to be normal except as follows:  Septum has a mild deviation to the left.  Ostiomeatal complexes are clear bilaterally.  No pus polyps or lesions.  Nasopharynx is clear.  No masses.  Base of tongue clear.  Epiglottis is normal.  True and false vocal cords are approximating equally bilaterally.  No nodules polyps or lesions.  Arytenoids show moderate edema consistent with silent reflux/laryngopharyngeal reflux.  Subglottis is clear.      The patient tolerated the procedure well and there were no complications.      Kaleb Storm, DO

## 2024-06-20 ENCOUNTER — APPOINTMENT (OUTPATIENT)
Dept: SLEEP MEDICINE | Facility: CLINIC | Age: 49
End: 2024-06-20
Payer: COMMERCIAL

## 2024-07-09 ENCOUNTER — OFFICE VISIT (OUTPATIENT)
Dept: GASTROENTEROLOGY | Facility: HOSPITAL | Age: 49
End: 2024-07-09
Payer: COMMERCIAL

## 2024-07-09 VITALS
TEMPERATURE: 97.6 F | HEART RATE: 93 BPM | OXYGEN SATURATION: 95 % | WEIGHT: 188.5 LBS | BODY MASS INDEX: 31.37 KG/M2 | DIASTOLIC BLOOD PRESSURE: 76 MMHG | SYSTOLIC BLOOD PRESSURE: 109 MMHG

## 2024-07-09 DIAGNOSIS — R10.13 DYSPEPSIA: Primary | ICD-10-CM

## 2024-07-09 DIAGNOSIS — K21.9 LARYNGOPHARYNGEAL REFLUX (LPR): ICD-10-CM

## 2024-07-09 DIAGNOSIS — Z12.11 ENCOUNTER FOR SCREENING COLONOSCOPY: ICD-10-CM

## 2024-07-09 DIAGNOSIS — R14.0 BLOATING: ICD-10-CM

## 2024-07-09 PROCEDURE — 1036F TOBACCO NON-USER: CPT | Performed by: STUDENT IN AN ORGANIZED HEALTH CARE EDUCATION/TRAINING PROGRAM

## 2024-07-09 PROCEDURE — 99215 OFFICE O/P EST HI 40 MIN: CPT | Performed by: STUDENT IN AN ORGANIZED HEALTH CARE EDUCATION/TRAINING PROGRAM

## 2024-07-09 PROCEDURE — 99205 OFFICE O/P NEW HI 60 MIN: CPT | Performed by: STUDENT IN AN ORGANIZED HEALTH CARE EDUCATION/TRAINING PROGRAM

## 2024-07-09 RX ORDER — SIMETHICONE 80 MG
80 TABLET,CHEWABLE ORAL EVERY 6 HOURS PRN
Qty: 60 TABLET | Refills: 1 | Status: SHIPPED | OUTPATIENT
Start: 2024-07-09 | End: 2024-09-07

## 2024-07-09 RX ORDER — POLYETHYLENE GLYCOL 3350, SODIUM CHLORIDE, SODIUM BICARBONATE, POTASSIUM CHLORIDE 420; 11.2; 5.72; 1.48 G/4L; G/4L; G/4L; G/4L
4000 POWDER, FOR SOLUTION ORAL ONCE
Qty: 4000 ML | Refills: 0 | Status: SHIPPED | OUTPATIENT
Start: 2024-07-09 | End: 2024-07-09

## 2024-07-09 RX ORDER — OMEPRAZOLE 20 MG/1
CAPSULE, DELAYED RELEASE ORAL
Qty: 90 CAPSULE | Refills: 3 | Status: SHIPPED | OUTPATIENT
Start: 2024-07-09

## 2024-07-09 SDOH — ECONOMIC STABILITY: FOOD INSECURITY: WITHIN THE PAST 12 MONTHS, THE FOOD YOU BOUGHT JUST DIDN'T LAST AND YOU DIDN'T HAVE MONEY TO GET MORE.: NEVER TRUE

## 2024-07-09 SDOH — ECONOMIC STABILITY: FOOD INSECURITY: WITHIN THE PAST 12 MONTHS, YOU WORRIED THAT YOUR FOOD WOULD RUN OUT BEFORE YOU GOT MONEY TO BUY MORE.: NEVER TRUE

## 2024-07-09 ASSESSMENT — PATIENT HEALTH QUESTIONNAIRE - PHQ9
10. IF YOU CHECKED OFF ANY PROBLEMS, HOW DIFFICULT HAVE THESE PROBLEMS MADE IT FOR YOU TO DO YOUR WORK, TAKE CARE OF THINGS AT HOME, OR GET ALONG WITH OTHER PEOPLE: VERY DIFFICULT
SUM OF ALL RESPONSES TO PHQ9 QUESTIONS 1 & 2: 6
1. LITTLE INTEREST OR PLEASURE IN DOING THINGS: NEARLY EVERY DAY
2. FEELING DOWN, DEPRESSED OR HOPELESS: NEARLY EVERY DAY

## 2024-07-09 ASSESSMENT — LIFESTYLE VARIABLES
HOW MANY STANDARD DRINKS CONTAINING ALCOHOL DO YOU HAVE ON A TYPICAL DAY: PATIENT DOES NOT DRINK
AUDIT-C TOTAL SCORE: 0
HOW OFTEN DO YOU HAVE A DRINK CONTAINING ALCOHOL: NEVER
HOW OFTEN DO YOU HAVE SIX OR MORE DRINKS ON ONE OCCASION: NEVER
SKIP TO QUESTIONS 9-10: 1

## 2024-07-09 ASSESSMENT — PAIN SCALES - GENERAL: PAINLEVEL: 8

## 2024-07-09 NOTE — PROGRESS NOTES
This is a 50yo F w/ PMH of anxiety, allergic rhinitis who has been referred for c/f celiac disease.    Per pt, over last 1-2 yrs, she has had worsening bloating and intermittent dull, achy abd pain 3-5/10 while eating gluten. Denies diarrhea. She takes omeprazole daily that helps relieve heartburn.    Denies dysphagia, odynophagia, heartburn, f/c, n/v/d, melena, BRBPR, wt loss, 12 point ROS done and neg unless otherwise stated.    Labs April 2024: DGP IgG positive, DGP Ig A neg, TTG IgG and IgA neg, Hb 14.2, plt 343, Cr 1.03, LFTs WNL    PMH/PSH: As above    SH: Denies smoking, alcohol, IVDU, works at [employer]    FH: mother and grandfather had gastric CA, denies FH of colon CA, esophageal or pancreatic cancer    PE:    Gen: AXOX3. NAD  HEENT: NC/AT.   Eyes: anicteric sclerae  CV: RRR  Pulm: non labored breathing  Abd: NTND. Tympanitic. no rebound  Ext: no edema  Skin: Non jaundiced    A/P: This is a 50yo F w/ PMH of anxiety, allergic rhinitis who has been referred for c/f celiac disease.    Per pt, over last 1-2 yrs, she has had worsening bloating and intermittent dull, achy abd pain 3-5/10 while eating gluten. Denies diarrhea. FH notable for gastric cancer in mother and grandfather.    We will order upper endoscopy for chronic dyspepsia, FH of gastric cancer (please obtain gastric biopsies) and to r/o celiac disease (please obtain duodenal biopsies) in setting of positive DGP IgG. If celiac negative, she might have non celiac gluten sensitivity.    Plan:    -upper endoscopy for chronic dyspepsia, FH of gastric cancer (please obtain gastric biopsies) and to r/o celiac disease (please obtain duodenal biopsies) in setting of positive DGP IgG.  -continue with omeprazole 20mg once daily 30 minutes before the 1st meal of the day, for your epigastric pain  -start simethicone 80mg tablet every 4-6 hours as needed for bloating  -average risk screening colonoscopy ordered    We will see you back in 4 months

## 2024-07-09 NOTE — PATIENT INSTRUCTIONS
Thank you for coming to GI clinic today, we will:    -order upper endoscopy for evaluation of abdominal pain and to rule out celiac diease  -continue with omeprazole 20mg once daily 30 minutes before the 1st meal of the day, for your epigastric pain  -start simethicone 80mg tablet every 4-6 hours as needed for bloating  -average risk screening colonoscopy ordered  -Please call [phone] to schedule your upper endoscopy and colonoscopy    We will see you back in 4 months

## 2024-07-23 ENCOUNTER — APPOINTMENT (OUTPATIENT)
Dept: OBSTETRICS AND GYNECOLOGY | Facility: CLINIC | Age: 49
End: 2024-07-23
Payer: COMMERCIAL

## 2024-07-23 VITALS — HEIGHT: 65 IN | BODY MASS INDEX: 32.15 KG/M2 | WEIGHT: 193 LBS

## 2024-07-23 DIAGNOSIS — Z01.419 WELL WOMAN EXAM: ICD-10-CM

## 2024-07-23 PROCEDURE — 1036F TOBACCO NON-USER: CPT | Performed by: OBSTETRICS & GYNECOLOGY

## 2024-07-23 PROCEDURE — 99396 PREV VISIT EST AGE 40-64: CPT | Performed by: OBSTETRICS & GYNECOLOGY

## 2024-07-23 PROCEDURE — 3008F BODY MASS INDEX DOCD: CPT | Performed by: OBSTETRICS & GYNECOLOGY

## 2024-07-23 PROCEDURE — 88175 CYTOPATH C/V AUTO FLUID REDO: CPT

## 2024-07-23 PROCEDURE — 87624 HPV HI-RISK TYP POOLED RSLT: CPT

## 2024-07-23 ASSESSMENT — PAIN SCALES - GENERAL: PAINLEVEL: 0-NO PAIN

## 2024-07-23 NOTE — PROGRESS NOTES
"Quin Antoine is a 49 y.o. female who is here for a routine exam. PCP = Lazara Anton, APRN-CNP    2 kids    Menses : sporadic and lighter  Contraception : other  HPV vaccine : No  Last pap : years ago normal  Last HPV : Uncertain  History of abnormal pap : No  Last mammogram : 2024 needed right diagnostic  History of abnormal mammogram : Yes, describe: Diagnostic normal  Colon cancer screen : Has colonoscopy scheduled    ROS  systems reviewed, anything negative noted in HPI    bladder: no dysuria, gross hematuria, urinary frequency, urgency or incontinence  breast: no lumps, nipple d/c, overlying skin changes, redness, or skin retraction    [unfilled]    Past med hx and past surg hx reviewed and notable for: Multiple medical issues including hypothyroid, fibromyalgia    Objective   Ht 1.651 m (5' 5\")   Wt 87.5 kg (193 lb)   LMP  (LMP Unknown) Comment: PT said periods are becoming irregular  BMI 32.12 kg/m²      General:   Alert and oriented, in no acute distress   Neck: Supple. No visible thyromegaly.    Breast/Axilla: Normal to palpation bilaterally without masses, skin changes, lymphadenopathy, or nipple discharge.    Abdomen: Soft, non-tender, without masses or organomegaly   Vulva: Normal architecture without erythema, masses, or lesions.    Vagina: Normal mucosa without lesions, masses, or atrophy. No abnormal vaginal discharge.    Cervix: Normal without masses, lesions, or signs of cervicitis.    Uterus: Normal mobile, non-enlarged uterus    Adnexa: Normal without masses or lesions   Pelvic Floor No POP noted.    Psych Normal affect. Normal mood.      Thank you for coming to your annual exam. Your findings during the exam were normal. Specific topics addressed during this exam included: healthy lifestyle, well woman screening guidelines,     Actions performed during this visit include:  - Clinical breast exam  - Clinical pelvic exam  - pap/hpv  - Mammogram up to date  - colonoscopy scheduled  No " orders of the defined types were placed in this encounter.          Please return for your next visit in 1 year.

## 2024-07-24 PROBLEM — J30.9 ALLERGIC RHINITIS: Status: ACTIVE | Noted: 2024-07-24

## 2024-07-24 NOTE — PROGRESS NOTES
"  Subjective   Patient ID:   [id number]   Quin Antoine is a 49 y.o. female who presents for Allergies, Allergy Testing, Hives, Itching, and Wheezing (NPV).    Chief Complaint   Patient presents with    Allergies    Allergy Testing    Hives    Itching    Wheezing     NPV        This is a new patient referred by CARLO Navarro-CNP, PCP, for evaluation of allergy symptoms.    Patient reports a longstanding H/O seasonal allergy symptom to dust.  She has developed skin issues, nasal congestion, eye burning and sinus issues despite azelastine and montelukast at bedtime.  She also tried Zyrtec but subsequently stopped all of them.  She also C/O waking congested every morning.  She will also experience congestion at night.  She has tried several allergy and wetting eyedrops with no relief.  Patient has cats, dog and guinea pigs.  Her PCP ordered allergy labs that returned significant for elevated IgE, reaction to grasses, trees and weeds.     Patient complains of new onset of recurrent itchy red dots on her arms >10 years as well as hives, described as welts, intermittently.  She is itchy currently though nothing is visible.  She had marks on her skin last week, which typically resolve within a day.      Patient is C/O fatigue and wonders if she has an immune issues.  She experiences several food sensitivities including dairy, cheese and pineapples.  She is C/O \"cold\" sores on her tongue currently.  She states she has a positive antibody for Celiac.  She would like further food testing.    Patient states she developed Hashimoto's in 2017 and EBV but she reports she never was told.  She was also found to have slipped discs and again, was never told anything.  She was subsequently prescribed muscle relaxants, which made her feel \"awful.\"  She was always a healthy person who exercised routinely, but was feeling awful and fatigued daily.  She was taking several supplements and feels she healed her thyroid that way.  " "She had her vitamin D level checked 24 and it was normal.     Patient saw Dr. Storm for vocal problems.    Patient works at [employer].      Review of Systems   Constitutional:  Positive for fatigue.        Feels unwell daily.   HENT:  Positive for congestion.         \"Cold\" sores on tongue.   Eyes:  Positive for redness (burning).   Skin:         \"Hives,\" itching and dry skin.     Objective     /80   Pulse 70   LMP  (LMP Unknown) Comment: PT said periods are becoming irregular  SpO2 98%      Physical Exam  Constitutional:       Appearance: Normal appearance.   HENT:      Head: Normocephalic and atraumatic.      Right Ear: External ear normal. There is no impacted cerumen.      Left Ear: External ear normal. There is no impacted cerumen.      Nose: Congestion (bilateral nasal turbinate edema) present. No rhinorrhea.   Eyes:      Extraocular Movements: Extraocular movements intact.      Conjunctiva/sclera: Conjunctivae normal.      Pupils: Pupils are equal, round, and reactive to light.   Cardiovascular:      Rate and Rhythm: Normal rate and regular rhythm.      Heart sounds: No murmur heard.     No friction rub. No gallop.   Pulmonary:      Effort: No respiratory distress.      Breath sounds: No wheezing, rhonchi or rales.   Skin:     General: Skin is warm and dry.   Neurological:      Mental Status: She is alert.   Psychiatric:         Mood and Affect: Mood normal.         Behavior: Behavior normal.     Allergy testing was performed on Quin Antoine using standard technique. There were no immediate complications.    Test Results  Controls  Positive Histamine: 5 x 5  Negative Saline: 0  Panel 1  Eg  English [place]: 0  Milk: 0  Oat: 0  Peanut: 0  Shrimp: 0  Soy: 0  Wheat: 0  Fruit Panel 2  Honeydew: 0  Lemon: 0  Orange: 0  Peach: 0  Pear: 0  Pineapple: 0  Raspberry: 0  Strawberry: 0  Tomato: 0  Watermelon: 0    Skin testing is positive to cockroaches, grasses, trees, weeds, dust mite and molds.  Foods " "were negative.    Assessment/Plan     Allergic rhinitis  She C/O lifetime sinus issues/congestion, eye burning, skin issues despite azelastine and montelukast.  She discontinued both and is waking congested every morning as well as at night.  4-25-24 - RAP showed elevated IgE and + to grasses, trees and weeds.     Skin testing is positive to cockroaches, grasses, trees, weeds, dust mite and molds.      Restart montelukast at bedtime.Complete prednisone taper to assess if symptoms are wholly from allergy    Adverse food reaction  She relays a H/O sensitivities to dairy, cheese, pineapples and had one positive antibody for Celiac.  She C/O \"cold\" sores on her tongue.    Skin testing to foods is negative. Most likely sensitivity    Urticaria  She C/O of new onset of recurrent itchy red dots on her arms >10 years as well as hives, described as welts, intermittently.  She is currently itching.    The differential diagnosis for chronic urticaria is vast and can include autoimmunity, allergy, malignancy, parasite infection, immunologic disorder, thyroid dysfunction, vitamin D deficiency, and hepatic or renal dysfunction. I am going to order a complete laboratory evaluation.      In the interim she can start zyrtec up to two pills twice a day as needed            Radha Rai MD         "

## 2024-07-24 NOTE — ASSESSMENT & PLAN NOTE
She C/O lifetime sinus issues/congestion, eye burning, skin issues despite azelastine and montelukast.  She discontinued both and is waking congested every morning as well as at night.  4-25-24 - RAP showed elevated IgE and + to grasses, trees and weeds.     Skin testing is positive to cockroaches, grasses, trees, weeds, dust mite and molds.      Restart montelukast at bedtime.Complete prednisone taper to assess if symptoms are wholly from allergy

## 2024-07-25 ENCOUNTER — APPOINTMENT (OUTPATIENT)
Dept: ALLERGY | Facility: CLINIC | Age: 49
End: 2024-07-25
Payer: COMMERCIAL

## 2024-07-25 ENCOUNTER — LAB (OUTPATIENT)
Dept: LAB | Facility: LAB | Age: 49
End: 2024-07-25
Payer: COMMERCIAL

## 2024-07-25 VITALS — DIASTOLIC BLOOD PRESSURE: 80 MMHG | OXYGEN SATURATION: 98 % | HEART RATE: 70 BPM | SYSTOLIC BLOOD PRESSURE: 110 MMHG

## 2024-07-25 DIAGNOSIS — T78.1XXA ADVERSE FOOD REACTION, INITIAL ENCOUNTER: ICD-10-CM

## 2024-07-25 DIAGNOSIS — J30.9 ALLERGIC RHINITIS, UNSPECIFIED SEASONALITY, UNSPECIFIED TRIGGER: Primary | ICD-10-CM

## 2024-07-25 DIAGNOSIS — L50.9 URTICARIA: ICD-10-CM

## 2024-07-25 DIAGNOSIS — T78.40XA ALLERGY, INITIAL ENCOUNTER: ICD-10-CM

## 2024-07-25 LAB
IGA SERPL-MCNC: 191 MG/DL (ref 70–400)
IGG SERPL-MCNC: 1020 MG/DL (ref 700–1600)
IGM SERPL-MCNC: 141 MG/DL (ref 40–230)
THYROPEROXIDASE AB SERPL-ACNC: 40 IU/ML

## 2024-07-25 PROCEDURE — 95004 PERQ TESTS W/ALRGNC XTRCS: CPT | Performed by: ALLERGY & IMMUNOLOGY

## 2024-07-25 PROCEDURE — 86376 MICROSOMAL ANTIBODY EACH: CPT

## 2024-07-25 PROCEDURE — 86800 THYROGLOBULIN ANTIBODY: CPT

## 2024-07-25 PROCEDURE — 99205 OFFICE O/P NEW HI 60 MIN: CPT | Performed by: ALLERGY & IMMUNOLOGY

## 2024-07-25 PROCEDURE — 82784 ASSAY IGA/IGD/IGG/IGM EACH: CPT

## 2024-07-25 RX ORDER — PREDNISONE 20 MG/1
TABLET ORAL
Qty: 20 TABLET | Refills: 0 | Status: SHIPPED | OUTPATIENT
Start: 2024-07-25 | End: 2024-08-06

## 2024-07-25 RX ORDER — MONTELUKAST SODIUM 10 MG/1
10 TABLET ORAL NIGHTLY
Qty: 90 TABLET | Refills: 3 | Status: SHIPPED | OUTPATIENT
Start: 2024-07-25 | End: 2025-07-25

## 2024-07-25 ASSESSMENT — ENCOUNTER SYMPTOMS
EYE REDNESS: 1
FATIGUE: 1

## 2024-07-25 NOTE — ASSESSMENT & PLAN NOTE
"She relays a H/O sensitivities to dairy, cheese, pineapples and had one positive antibody for Celiac.  She C/O \"cold\" sores on her tongue.    Skin testing to foods is negative. Most likely sensitivity  "

## 2024-07-25 NOTE — ASSESSMENT & PLAN NOTE
She C/O of new onset of recurrent itchy red dots on her arms >10 years as well as hives, described as welts, intermittently.  She is currently itching.    The differential diagnosis for chronic urticaria is vast and can include autoimmunity, allergy, malignancy, parasite infection, immunologic disorder, thyroid dysfunction, vitamin D deficiency, and hepatic or renal dysfunction. I am going to order a complete laboratory evaluation.      In the interim she can start zyrtec up to two pills twice a day as needed

## 2024-07-25 NOTE — PATIENT INSTRUCTIONS
Skin testing is positive to cockroaches, grasses, trees, weeds, dust mite and molds.  Foods were negative.    Complete blood work to evaluate urticaria.  We will call you with results, recommendations and followup plan.     Restart montelukast every night.    Complete prednisone taper.  Send me a message after completion with an update on your symptom status.

## 2024-07-27 LAB
THYROGLOB AB SERPL-ACNC: <0.9 IU/ML (ref 0–4)
TRYPTASE SERPL-MCNC: 6.8 UG/L

## 2024-08-01 LAB
CYTOLOGY CMNT CVX/VAG CYTO-IMP: NORMAL
HPV HR 12 DNA GENITAL QL NAA+PROBE: NEGATIVE
HPV HR GENOTYPES PNL CVX NAA+PROBE: NEGATIVE
HPV16 DNA SPEC QL NAA+PROBE: NEGATIVE
HPV18 DNA SPEC QL NAA+PROBE: NEGATIVE
LAB AP HPV GENOTYPE QUESTION: YES
LAB AP HPV HR: NORMAL
LABORATORY COMMENT REPORT: NORMAL
PATH REPORT.TOTAL CANCER: NORMAL

## 2024-08-05 ENCOUNTER — TELEPHONE (OUTPATIENT)
Dept: ALLERGY | Facility: CLINIC | Age: 49
End: 2024-08-05
Payer: COMMERCIAL

## 2024-08-08 LAB — URTICARIA-INDUCING ACTIVITY: 0 INDEX UNIT

## 2024-08-09 LAB
CD203C (PERCENT OF BASOPHILS): 28.3 % (ref 0–12)
INTERPRETATION- ANTI-IGE RECEPTOR AB: ABNORMAL

## 2024-09-12 NOTE — PROGRESS NOTES
History of Present Illness:  Quin Antoine is a 49 y.o. female with a family history of gastric cancer. She was referred to the Cancer Genetics Clinic at [facility] by her healthcare provider, LUIS ANTONIO Navarro, for genetic evaluation given her Ashkenazi Mormonism ancestry.  Ms. Antoine is interested in genetic testing to clarify her personal risk for cancer, as well as the risks to her family members.    Cancer Medical History:  Personal history of cancer? {YES/NO:18786}  Type: ***  Age at diagnosis: ***  Summary: ***    History of other cancers: {YES/NO:24961}    Prior genetic testing? {YES/NO:18748}    Cancer screening history:  Breast imaging? ***  BI MAMMO BILATERAL SCREENING TOMOSYNTHESIS on 5/3/2024 -->Right breast asymmetries. Additional imaging recommended.   BI MAMMO RIGHT DIAGNOSTIC TOMOSYNTHESIS; 6/6/2024  for right breast asymmetry.  No mammographic evidence of malignancy. Received recommendation to repeat in 1 year.   The breast tissue is heterogeneously dense.  Patient {Mirtha, Reports:448607} personal history of breast biopsy. ***  PAP smear? ***   Colonoscopy? *** Has order.    Patient {Mirtha, Reports:430781} personal history of colorectal polyps. ***   Upper endoscopy? ***  Has order.   Dermatology? ***  Other cancer screening? ***    Reproductive History:  Number of children: ***  Number of pregnancies: ***  Age first birth: ***  Breast feeding? {YES/NO:200010}  Menarche (age): ***  Menopause (age): ***  OCP: {YES/NO:200010}  HRT: {YES/NO:200010}    Hysterectomy? {YES/NO:200010}  Oophorectomy? {YES/NO:200010}    Family history:  A 4-generation pedigree was obtained and was significant for the following:   ***    FHX (per chart review):  mother and grandfather had gastric CA,   Per chart review, denies FHx of colon ca, esophageal or pancreatic cancer     Maternal ancestry is ***.  Paternal ancestry is ***. She has Ashkenazi Mormonism ancestry (***per chart review). There is  {maternal\paternal\no known:51735} consanguinity.      Genetic Counseling:  ***    Plan:  ***

## 2024-09-13 ENCOUNTER — APPOINTMENT (OUTPATIENT)
Dept: GENETICS | Facility: HOSPITAL | Age: 49
End: 2024-09-13
Payer: COMMERCIAL

## 2024-12-29 ENCOUNTER — OFFICE VISIT (OUTPATIENT)
Dept: URGENT CARE | Age: 49
End: 2024-12-29
Payer: COMMERCIAL

## 2024-12-29 VITALS
HEART RATE: 70 BPM | RESPIRATION RATE: 16 BRPM | WEIGHT: 170 LBS | BODY MASS INDEX: 28.32 KG/M2 | SYSTOLIC BLOOD PRESSURE: 121 MMHG | TEMPERATURE: 96.8 F | HEIGHT: 65 IN | DIASTOLIC BLOOD PRESSURE: 86 MMHG | OXYGEN SATURATION: 97 %

## 2024-12-29 DIAGNOSIS — N76.0 ACUTE VAGINITIS: ICD-10-CM

## 2024-12-29 DIAGNOSIS — R39.89 URINARY PROBLEM: ICD-10-CM

## 2024-12-29 DIAGNOSIS — R09.82 PND (POST-NASAL DRIP): ICD-10-CM

## 2024-12-29 DIAGNOSIS — N39.0 URINARY TRACT INFECTION WITHOUT HEMATURIA, SITE UNSPECIFIED: ICD-10-CM

## 2024-12-29 DIAGNOSIS — N89.8 VAGINAL DISCHARGE: ICD-10-CM

## 2024-12-29 DIAGNOSIS — R53.83 FATIGUE, UNSPECIFIED TYPE: ICD-10-CM

## 2024-12-29 DIAGNOSIS — N89.8 ITCHING IN THE VAGINAL AREA: ICD-10-CM

## 2024-12-29 DIAGNOSIS — R30.0 DYSURIA: Primary | ICD-10-CM

## 2024-12-29 LAB
POC APPEARANCE, URINE: ABNORMAL
POC BILIRUBIN, URINE: NEGATIVE
POC BLOOD, URINE: NEGATIVE
POC COLOR, URINE: YELLOW
POC GLUCOSE, URINE: NEGATIVE MG/DL
POC KETONES, URINE: NEGATIVE MG/DL
POC LEUKOCYTES, URINE: NEGATIVE
POC NITRITE,URINE: NEGATIVE
POC PH, URINE: 6 PH
POC PROTEIN, URINE: NEGATIVE MG/DL
POC RAPID INFLUENZA A: NEGATIVE
POC RAPID INFLUENZA B: NEGATIVE
POC SARS-COV-2 AG BINAX: NORMAL
POC SPECIFIC GRAVITY, URINE: 1.02
POC UROBILINOGEN, URINE: 0.2 EU/DL

## 2024-12-29 PROCEDURE — 87591 N.GONORRHOEAE DNA AMP PROB: CPT

## 2024-12-29 PROCEDURE — 81003 URINALYSIS AUTO W/O SCOPE: CPT

## 2024-12-29 PROCEDURE — 99213 OFFICE O/P EST LOW 20 MIN: CPT

## 2024-12-29 PROCEDURE — 3008F BODY MASS INDEX DOCD: CPT

## 2024-12-29 PROCEDURE — 87491 CHLMYD TRACH DNA AMP PROBE: CPT

## 2024-12-29 PROCEDURE — 87661 TRICHOMONAS VAGINALIS AMPLIF: CPT

## 2024-12-29 PROCEDURE — 1036F TOBACCO NON-USER: CPT

## 2024-12-29 PROCEDURE — 87205 SMEAR GRAM STAIN: CPT

## 2024-12-29 PROCEDURE — 87804 INFLUENZA ASSAY W/OPTIC: CPT

## 2024-12-29 PROCEDURE — 87811 SARS-COV-2 COVID19 W/OPTIC: CPT

## 2024-12-29 PROCEDURE — 87086 URINE CULTURE/COLONY COUNT: CPT

## 2024-12-29 RX ORDER — PHENAZOPYRIDINE HYDROCHLORIDE 200 MG/1
200 TABLET, FILM COATED ORAL 3 TIMES DAILY PRN
Qty: 9 TABLET | Refills: 0 | Status: SHIPPED | OUTPATIENT
Start: 2024-12-29 | End: 2025-01-01

## 2024-12-29 RX ORDER — NITROFURANTOIN 25; 75 MG/1; MG/1
100 CAPSULE ORAL 2 TIMES DAILY
Qty: 10 CAPSULE | Refills: 0 | Status: SHIPPED | OUTPATIENT
Start: 2024-12-29 | End: 2025-01-03

## 2024-12-29 ASSESSMENT — ENCOUNTER SYMPTOMS
PSYCHIATRIC NEGATIVE: 1
CONSTITUTIONAL NEGATIVE: 1
FLANK PAIN: 0
FREQUENCY: 1
MUSCULOSKELETAL NEGATIVE: 1
DYSURIA: 1
RESPIRATORY NEGATIVE: 1
EYES NEGATIVE: 1
CARDIOVASCULAR NEGATIVE: 1
HEMATURIA: 0
DIFFICULTY URINATING: 0
GASTROINTESTINAL NEGATIVE: 1
NEUROLOGICAL NEGATIVE: 1

## 2024-12-29 NOTE — PROGRESS NOTES
"Subjective   Patient ID: Quin Antoine is a 49 y.o. female. They present today with a chief complaint of Urinary Problem (Burns with urination - very little output - abnormal discharge with itchiness ).    History of Present Illness  Urinary symptoms - burning with urination, thin vaginal discharge, itching x1-2 weeks. Denies sexually active over the past 12 months. In addition, feeling fatigued as well. Denies URI s/s at this time but would like to be swabbed for COVID/Flu. Patient denies any CP, SOB, HA, fever, abdominal pain, and nausea/vomiting otherwise.      History provided by:  Patient      Past Medical History  Allergies as of 12/29/2024 - Reviewed 12/29/2024   Allergen Reaction Noted    Albuterol Hives 07/09/2024       (Not in a hospital admission)       Past Medical History:   Diagnosis Date    Allergy, unspecified, initial encounter     Allergic       Past Surgical History:   Procedure Laterality Date    TONSILLECTOMY  09/18/2014    Tonsillectomy        reports that she has never smoked. She has never been exposed to tobacco smoke. She has never used smokeless tobacco. She reports that she does not currently use alcohol. She reports that she does not use drugs.    Review of Systems  Review of Systems   Constitutional: Negative.    HENT: Negative.     Eyes: Negative.    Respiratory: Negative.     Cardiovascular: Negative.    Gastrointestinal: Negative.    Genitourinary:  Positive for dysuria, frequency, urgency and vaginal discharge. Negative for decreased urine volume, difficulty urinating, flank pain, hematuria, pelvic pain, vaginal bleeding and vaginal pain.   Musculoskeletal: Negative.    Skin: Negative.    Neurological: Negative.    Psychiatric/Behavioral: Negative.                                    Objective    Vitals:    12/29/24 0818   BP: 121/86   Pulse: 70   Resp: 16   Temp: 36 °C (96.8 °F)   SpO2: 97%   Weight: 77.1 kg (170 lb)   Height: 1.651 m (5' 5\")     No LMP recorded.    Physical " Exam  Constitutional:       General: She is not in acute distress.     Appearance: Normal appearance. She is not ill-appearing.   HENT:      Head: Normocephalic and atraumatic.      Right Ear: Tympanic membrane and ear canal normal.      Left Ear: Tympanic membrane and ear canal normal.      Nose: Nose normal.      Mouth/Throat:      Mouth: Mucous membranes are moist.      Pharynx: Oropharynx is clear.   Eyes:      Extraocular Movements: Extraocular movements intact.      Pupils: Pupils are equal, round, and reactive to light.   Cardiovascular:      Rate and Rhythm: Normal rate and regular rhythm.      Pulses: Normal pulses.      Heart sounds: Normal heart sounds.   Pulmonary:      Effort: Pulmonary effort is normal.      Breath sounds: Normal breath sounds.   Abdominal:      General: Abdomen is flat. Bowel sounds are normal.      Palpations: Abdomen is soft.   Skin:     General: Skin is warm and dry.   Neurological:      Mental Status: She is alert and oriented to person, place, and time.   Psychiatric:         Mood and Affect: Mood normal.         Behavior: Behavior normal.         Procedures    Point of Care Test & Imaging Results from this visit  Results for orders placed or performed in visit on 12/29/24   POCT UA Automated manually resulted   Result Value Ref Range    POC Color, Urine Yellow Straw, Yellow, Light-Yellow    POC Appearance, Urine Cloudy (A) Clear    POC Glucose, Urine NEGATIVE NEGATIVE mg/dl    POC Bilirubin, Urine NEGATIVE NEGATIVE    POC Ketones, Urine NEGATIVE NEGATIVE mg/dl    POC Specific Gravity, Urine 1.025 1.005 - 1.035    POC Blood, Urine NEGATIVE NEGATIVE    POC PH, Urine 6.0 No Reference Range Established PH    POC Protein, Urine NEGATIVE NEGATIVE mg/dl    POC Urobilinogen, Urine 0.2 0.2, 1.0 EU/DL    Poc Nitrite, Urine NEGATIVE NEGATIVE    POC Leukocytes, Urine NEGATIVE NEGATIVE   POCT Covid-19 Rapid Antigen   Result Value Ref Range    POC BONNIE-COV-2 AG  Presumptive negative test for  SARS-CoV-2 (no antigen detected)     Presumptive negative test for SARS-CoV-2 (no antigen detected)   POCT Influenza A/B manually resulted   Result Value Ref Range    POC Rapid Influenza A Negative Negative    POC Rapid Influenza B Negative Negative      No results found.    Diagnostic study results (if any) were reviewed by LUIS ANTONIO Alfaro.    Assessment/Plan   Allergies, medications, history, and pertinent labs/EKGs/Imaging reviewed by LUIS ANTONIO Alfaro.     Medical Decision Making  Suspect UTI. Will send Macrobid and pyridium for dysuria. Will send out culture and alter treatment if needed per result. D/t vaginitis s/s will send out vaginitis panel and treat based on results. Patient verbalized understanding and agreed with the plan of care.      At time of discharge, patient was clinically well-appearing and appropriate for outpatient management. The patient/parent/guardian was educated regarding diagnosis, supportive care, OTC and Rx medications. The patient/parent/guardian was given the opportunity to ask questions prior to discharge. They verbalized understanding of discussion of treatment plan, expected course of illness and/or injury, indications on when to return to , when to seek further evaluation in ED/call 911, and the need to follow up with PCP and/or specialist as referred. Patient/parent/guardian was provided with work/school documentation if requested. Patient stable upon discharge.      Orders and Diagnoses  Diagnoses and all orders for this visit:  Dysuria  -     phenazopyridine (Pyridium) 200 mg tablet; Take 1 tablet (200 mg) by mouth 3 times a day as needed for bladder spasms (for burning with urination) for up to 3 days.  Urinary problem  -     POCT UA Automated manually resulted  Itching in the vaginal area  -     C. trachomatis / N. gonorrhoeae, Amplified  Urinary tract infection without hematuria, site unspecified  -     nitrofurantoin, macrocrystal-monohydrate,  (Macrobid) 100 mg capsule; Take 1 capsule (100 mg) by mouth 2 times a day for 5 days.  -     Urine Culture  PND (post-nasal drip)  Vaginal discharge  -     C. trachomatis / N. gonorrhoeae, Amplified  -     Vaginitis Gram Stain For Bacterial Vaginosis + Yeast  -     Trichomonas vaginalis, Amplified  Fatigue, unspecified type  -     POCT Covid-19 Rapid Antigen  -     POCT Influenza A/B manually resulted  Acute vaginitis      Medical Admin Record      Patient disposition: Home    Electronically signed by LUIS ANTONIO Alfaro  10:34 AM

## 2024-12-30 LAB
BACTERIAL VAGINOSIS VAG-IMP: NORMAL
C TRACH RRNA SPEC QL NAA+PROBE: NEGATIVE
CLUE CELLS VAG LPF-#/AREA: NORMAL /[LPF]
N GONORRHOEA DNA SPEC QL PROBE+SIG AMP: NEGATIVE
NUGENT SCORE: 6
T VAGINALIS RRNA SPEC QL NAA+PROBE: NEGATIVE
YEAST VAG WET PREP-#/AREA: NORMAL

## 2024-12-31 ENCOUNTER — DOCUMENTATION (OUTPATIENT)
Dept: URGENT CARE | Age: 49
End: 2024-12-31

## 2024-12-31 DIAGNOSIS — N76.0 ACUTE VAGINITIS: Primary | ICD-10-CM

## 2024-12-31 LAB — BACTERIA UR CULT: NORMAL

## 2024-12-31 RX ORDER — METRONIDAZOLE 500 MG/1
500 TABLET ORAL 2 TIMES DAILY
Qty: 14 TABLET | Refills: 0 | Status: SHIPPED | OUTPATIENT
Start: 2024-12-31 | End: 2025-01-07

## 2025-01-05 ENCOUNTER — OFFICE VISIT (OUTPATIENT)
Dept: URGENT CARE | Age: 50
End: 2025-01-05
Payer: COMMERCIAL

## 2025-01-05 VITALS
HEART RATE: 85 BPM | WEIGHT: 170 LBS | RESPIRATION RATE: 19 BRPM | HEIGHT: 65 IN | SYSTOLIC BLOOD PRESSURE: 120 MMHG | TEMPERATURE: 97 F | BODY MASS INDEX: 28.32 KG/M2 | DIASTOLIC BLOOD PRESSURE: 81 MMHG | OXYGEN SATURATION: 95 %

## 2025-01-05 DIAGNOSIS — H10.33 ACUTE CONJUNCTIVITIS OF BOTH EYES, UNSPECIFIED ACUTE CONJUNCTIVITIS TYPE: Primary | ICD-10-CM

## 2025-01-05 PROCEDURE — 99213 OFFICE O/P EST LOW 20 MIN: CPT | Performed by: NURSE PRACTITIONER

## 2025-01-05 PROCEDURE — 3008F BODY MASS INDEX DOCD: CPT | Performed by: NURSE PRACTITIONER

## 2025-01-05 PROCEDURE — 1036F TOBACCO NON-USER: CPT | Performed by: NURSE PRACTITIONER

## 2025-01-05 RX ORDER — TOBRAMYCIN 3 MG/ML
1 SOLUTION/ DROPS OPHTHALMIC EVERY 4 HOURS
Qty: 5 ML | Refills: 0 | Status: SHIPPED | OUTPATIENT
Start: 2025-01-05 | End: 2025-01-12

## 2025-01-05 ASSESSMENT — ENCOUNTER SYMPTOMS
PHOTOPHOBIA: 0
EYE REDNESS: 1
EYE DISCHARGE: 1
EYE PAIN: 0
EYE ITCHING: 1

## 2025-01-05 ASSESSMENT — PAIN SCALES - GENERAL: PAINLEVEL_OUTOF10: 8

## 2025-01-05 NOTE — PROGRESS NOTES
"Subjective   Patient ID: Quin Antoine is a 49 y.o. female. They present today with a chief complaint of Eye Problem (Pink eye x 1 day no issues seeing ).    History of Present Illness  Patient presents with concern for pink eye. States yesterday her eyes started bothering her with itching, irritation. States when she got up today they were crusted over. States she thought it may be allergies so she took an allergy pill but was worried for infection.      Eye Problem  Associated symptoms: discharge, itching and redness    Associated symptoms: no photophobia        Past Medical History  Allergies as of 01/05/2025 - Reviewed 01/05/2025   Allergen Reaction Noted    Albuterol Hives 07/09/2024       (Not in a hospital admission)       Past Medical History:   Diagnosis Date    Allergy, unspecified, initial encounter     Allergic       Past Surgical History:   Procedure Laterality Date    TONSILLECTOMY  09/18/2014    Tonsillectomy        reports that she has never smoked. She has never been exposed to tobacco smoke. She has never used smokeless tobacco. She reports that she does not currently use alcohol. She reports that she does not use drugs.    Review of Systems  Review of Systems   Eyes:  Positive for discharge, redness and itching. Negative for photophobia, pain and visual disturbance.                                  Objective    Vitals:    01/05/25 1454   BP: 120/81   BP Location: Right arm   Patient Position: Sitting   BP Cuff Size: Adult   Pulse: 85   Resp: 19   Temp: 36.1 °C (97 °F)   TempSrc: Skin   SpO2: 95%   Weight: 77.1 kg (170 lb)   Height: 1.651 m (5' 5\")     No LMP recorded (lmp unknown). Patient is postmenopausal.    Physical Exam  Vitals reviewed.   Constitutional:       Appearance: Normal appearance.   Eyes:      Conjunctiva/sclera: Conjunctivae normal.      Comments: Slight crusting noted on upper an lower lashes   Cardiovascular:      Rate and Rhythm: Normal rate and regular rhythm.   Pulmonary: "      Effort: Pulmonary effort is normal.      Breath sounds: Normal breath sounds.   Skin:     General: Skin is warm and dry.   Neurological:      Mental Status: She is alert.         Procedures    Point of Care Test & Imaging Results from this visit  No results found for this visit on 01/05/25.   No results found.    Diagnostic study results (if any) were reviewed by [facility].    Assessment/Plan   Allergies, medications, history, and pertinent labs/EKGs/Imaging reviewed by CARLO Huerta-CNP. Advised I do not suspect bacterial infection, likely allergy or dry eye. Recommend supportive eye drops, provided rx for antibiotic eye drops to use if she develops more redness, with purulent drainage as she states she's been itching them a lot and has developed pink eye in the past after similar episodes.     Medical Decision Making  At time of discharge patient was clinically well-appearing and HDS for outpatient management. The patient and/or family was educated regarding diagnosis, supportive care, OTC and Rx medications. The patient and/or family was given the opportunity to ask questions prior to discharge.  They verbalized understanding of my discussion of the plans for treatment, expected course, indications to return to  or seek further evaluation in ED, and the need for timely follow up as directed.   They were provided with a work/school excuse if requested.      Orders and Diagnoses  Diagnoses and all orders for this visit:  Acute conjunctivitis of both eyes, unspecified acute conjunctivitis type  -     tobramycin (Tobrex) 0.3 % ophthalmic solution; Administer 1 drop into both eyes every 4 hours for 7 days.      Medical Admin Record      Patient disposition: Home    Electronically signed by [facility]  3:10 PM

## 2025-04-08 ENCOUNTER — TELEPHONE (OUTPATIENT)
Dept: PRIMARY CARE | Facility: CLINIC | Age: 50
End: 2025-04-08
Payer: COMMERCIAL

## 2025-04-08 NOTE — TELEPHONE ENCOUNTER
Pt called regarding some FMLA paperwork  I did tell her she has not been seen since last May so she is on schedule for April 22 not sure if you need me to change anything  Thank you

## 2025-04-12 ENCOUNTER — OFFICE VISIT (OUTPATIENT)
Dept: URGENT CARE | Age: 50
End: 2025-04-12
Payer: COMMERCIAL

## 2025-04-12 VITALS
BODY MASS INDEX: 26.66 KG/M2 | TEMPERATURE: 96.7 F | OXYGEN SATURATION: 95 % | SYSTOLIC BLOOD PRESSURE: 112 MMHG | RESPIRATION RATE: 18 BRPM | HEART RATE: 86 BPM | WEIGHT: 160 LBS | DIASTOLIC BLOOD PRESSURE: 76 MMHG | HEIGHT: 65 IN

## 2025-04-12 DIAGNOSIS — R10.32 LEFT LOWER QUADRANT ABDOMINAL PAIN: Primary | ICD-10-CM

## 2025-04-12 LAB
POC APPEARANCE, URINE: CLEAR
POC BILIRUBIN, URINE: NEGATIVE
POC BLOOD, URINE: NEGATIVE
POC COLOR, URINE: YELLOW
POC GLUCOSE, URINE: NEGATIVE MG/DL
POC KETONES, URINE: NEGATIVE MG/DL
POC LEUKOCYTES, URINE: NEGATIVE
POC NITRITE,URINE: NEGATIVE
POC PH, URINE: 8 PH
POC PROTEIN, URINE: NEGATIVE MG/DL
POC SPECIFIC GRAVITY, URINE: 1.01
POC UROBILINOGEN, URINE: 0.2 EU/DL

## 2025-04-12 PROCEDURE — 81003 URINALYSIS AUTO W/O SCOPE: CPT | Performed by: NURSE PRACTITIONER

## 2025-04-12 PROCEDURE — 99214 OFFICE O/P EST MOD 30 MIN: CPT | Performed by: NURSE PRACTITIONER

## 2025-04-12 PROCEDURE — 3008F BODY MASS INDEX DOCD: CPT | Performed by: NURSE PRACTITIONER

## 2025-04-12 PROCEDURE — 1036F TOBACCO NON-USER: CPT | Performed by: NURSE PRACTITIONER

## 2025-04-12 ASSESSMENT — ENCOUNTER SYMPTOMS
CONSTIPATION: 1
FATIGUE: 0
ABDOMINAL PAIN: 1
ABDOMINAL DISTENTION: 0
FEVER: 0
NAUSEA: 1
FREQUENCY: 0
HEADACHES: 0
FLANK PAIN: 0
CHILLS: 0
VOMITING: 0
DYSURIA: 0
BLOOD IN STOOL: 0
HEMATURIA: 0

## 2025-04-12 ASSESSMENT — PAIN SCALES - GENERAL: PAINLEVEL_OUTOF10: 8

## 2025-04-12 NOTE — PATIENT INSTRUCTIONS
I recommend you go to the ER for further evaluation at higher level of care for your abdominal pain.

## 2025-04-12 NOTE — PROGRESS NOTES
"Subjective   Patient ID: Quin Antoine is a 50 y.o. female. They present today with a chief complaint of Other (? Kidney stone feels like a UTI pain when urinating x 3 days  ).    History of Present Illness  Patient presents with concern for 3-day history of LLQ abd pain. States she is constipated, has been taking iron at the direction of dermatology, states lab showed low iron, but has not had repeat labs since starting iron. States she has small, thin BM, but feels constipated. States she has not had a colonoscopy.           Past Medical History  Allergies as of 04/12/2025 - Reviewed 04/12/2025   Allergen Reaction Noted    Albuterol Hives 07/09/2024       (Not in a hospital admission)       Past Medical History:   Diagnosis Date    Allergy, unspecified, initial encounter     Allergic       Past Surgical History:   Procedure Laterality Date    TONSILLECTOMY  09/18/2014    Tonsillectomy        reports that she has never smoked. She has never been exposed to tobacco smoke. She has never used smokeless tobacco. She reports that she does not currently use alcohol. She reports that she does not use drugs.    Review of Systems  Review of Systems   Constitutional:  Negative for chills, fatigue and fever.   Gastrointestinal:  Positive for abdominal pain, constipation and nausea. Negative for abdominal distention, blood in stool and vomiting.   Genitourinary:  Negative for dysuria, flank pain, frequency, hematuria and urgency.   Neurological:  Negative for headaches.                                  Objective    Vitals:    04/12/25 0824   BP: 112/76   BP Location: Right arm   Patient Position: Sitting   BP Cuff Size: Adult   Pulse: 86   Resp: 18   Temp: 35.9 °C (96.7 °F)   TempSrc: Temporal   SpO2: 95%   Weight: 72.6 kg (160 lb)   Height: 1.651 m (5' 5\")     No LMP recorded (lmp unknown). Patient is postmenopausal.    Physical Exam  Vitals reviewed.   Constitutional:       Appearance: Normal appearance.   Cardiovascular: "      Rate and Rhythm: Normal rate.   Pulmonary:      Effort: Pulmonary effort is normal.   Abdominal:      General: There is no distension.      Palpations: Abdomen is soft.      Tenderness: There is abdominal tenderness.   Skin:     General: Skin is warm and dry.   Neurological:      Mental Status: She is alert.         Procedures    Point of Care Test & Imaging Results from this visit  Results for orders placed or performed in visit on 04/12/25   POCT UA Automated manually resulted   Result Value Ref Range    POC Color, Urine Yellow Straw, Yellow, Light-Yellow    POC Appearance, Urine Clear Clear    POC Glucose, Urine NEGATIVE NEGATIVE mg/dl    POC Bilirubin, Urine NEGATIVE NEGATIVE    POC Ketones, Urine NEGATIVE NEGATIVE mg/dl    POC Specific Gravity, Urine 1.010 1.005 - 1.035    POC Blood, Urine NEGATIVE NEGATIVE    POC PH, Urine 8.0 No Reference Range Established PH    POC Protein, Urine NEGATIVE NEGATIVE mg/dl    POC Urobilinogen, Urine 0.2 0.2, 1.0 EU/DL    Poc Nitrite, Urine NEGATIVE NEGATIVE    POC Leukocytes, Urine NEGATIVE NEGATIVE      Imaging  No results found.    Cardiology, Vascular, and Other Imaging  No other imaging results found for the past 2 days      Diagnostic study results (if any) were reviewed by LUIS ANTONIO Huerta.    Assessment/Plan   Allergies, medications, history, and pertinent labs/EKGs/Imaging reviewed by LUIS ANTONIO Huerta.     Medical Decision Making  Advised go to the ER for evaluation at higher level of care. Advised blood work, possibly imaging will help determine cause of symptoms and treatment. Pt verbalized agreement and understanding.     Orders and Diagnoses  Diagnoses and all orders for this visit:  Painful urination  -     POCT UA Automated manually resulted      Medical Admin Record      Patient disposition: ED    Electronically signed by LUIS ANTONIO Huerta  8:59 AM

## 2025-04-18 ENCOUNTER — DOCUMENTATION (OUTPATIENT)
Dept: PRIMARY CARE | Facility: CLINIC | Age: 50
End: 2025-04-18
Payer: COMMERCIAL

## 2025-04-18 ENCOUNTER — TELEPHONE (OUTPATIENT)
Dept: PRIMARY CARE | Facility: CLINIC | Age: 50
End: 2025-04-18
Payer: COMMERCIAL

## 2025-04-18 NOTE — TELEPHONE ENCOUNTER
Pt called and she said her stomach hurts she is been ED 4/12 and pt wants a note for work.She said ED gave it to her for one day.I explained to pt that she has not been seen since May 2024 and she does have an appointment on 4/22

## 2025-04-21 ENCOUNTER — INPATIENT HOSPITAL (OUTPATIENT)
Dept: URBAN - METROPOLITAN AREA HOSPITAL 50 | Facility: HOSPITAL | Age: 50
End: 2025-04-21
Payer: COMMERCIAL

## 2025-04-21 ENCOUNTER — PATIENT OUTREACH (OUTPATIENT)
Dept: PRIMARY CARE | Facility: CLINIC | Age: 50
End: 2025-04-21
Payer: COMMERCIAL

## 2025-04-21 DIAGNOSIS — K57.32 DIVERTICULITIS OF LARGE INTESTINE WITHOUT PERFORATION OR ABS: ICD-10-CM

## 2025-04-21 DIAGNOSIS — K59.09 OTHER CONSTIPATION: ICD-10-CM

## 2025-04-21 PROCEDURE — 99222 1ST HOSP IP/OBS MODERATE 55: CPT | Performed by: INTERNAL MEDICINE

## 2025-04-21 RX ORDER — METRONIDAZOLE 500 MG/1
500 TABLET ORAL 3 TIMES DAILY
COMMUNITY
Start: 2025-04-20 | End: 2025-05-04

## 2025-04-21 RX ORDER — AMOXICILLIN 250 MG
1 CAPSULE ORAL DAILY
COMMUNITY

## 2025-04-21 RX ORDER — CEFDINIR 300 MG/1
300 CAPSULE ORAL 2 TIMES DAILY
COMMUNITY
Start: 2025-04-20 | End: 2025-04-27

## 2025-04-21 RX ORDER — POLYETHYLENE GLYCOL 3350 17 G/17G
17 POWDER, FOR SOLUTION ORAL DAILY
COMMUNITY

## 2025-04-21 RX ORDER — OXYCODONE HYDROCHLORIDE 5 MG/1
CAPSULE ORAL EVERY 8 HOURS PRN
COMMUNITY
Start: 2025-04-20 | End: 2025-04-23

## 2025-04-21 RX ORDER — ACETAMINOPHEN 500 MG
500 TABLET ORAL EVERY 6 HOURS PRN
COMMUNITY
Start: 2025-04-20 | End: 2025-05-04

## 2025-04-21 NOTE — PROGRESS NOTES
"Discharge Facility:[facility]  Discharge Diagnosis:Diverticulitis  Admission Date:4.19.25  Discharge Date: 4.20.25    PCP Appointment Date:4.22.25  Specialist Appointment Date:   Hospital Encounter and Summary Linked: Yes    Hospital Encounter    See discharge assessment below for further details  Wrap Up  Wrap Up Additional Comments: Spoke with patient. States still experiencing abd pain. \"Its pretty strong.\" PLans to discuss [facility]. To gave a colonoscopy and Upper GI after diverticulitis is under cpntro; (4/21/2025  3:49 PM)    Engagement  Call Start Time: 1550 (4/21/2025  3:49 PM)    Medications  Medications reviewed with patient/caregiver?: Yes (4/21/2025  3:49 PM)  Is the patient having any side effects they believe may be caused by any medication additions or changes?: No (4/21/2025  3:49 PM)  Does the patient have all medications ordered at discharge?: Yes (4/21/2025  3:49 PM)  Care Management Interventions: No intervention needed (4/21/2025  3:49 PM)  Prescription Comments: acetaminophen (TYLENOL) 500 mg tablet  Take 2 tablets by mouth every 6 hours as needed for pain.       04/20/2025   Active  metroNIDAZOLE (FLAGYL) 500 mg tablet  Take 1 tablet by mouth three times a day for 14 days. 42 tablet      04/20/2025 05/04/2025 Active  cefdinir (OMNICEF) 300 mg capsule  Take 1 capsule by mouth two times a day for 7 days. 14 capsule      04/20/2025 04/27/2025 Active  metroNIDAZOLE (FLAGYL) 500 mg tablet  Take 1 tablet by mouth three times a day for 7 days. 21 tablet      04/20/2025 04/27/2025 Active  polyethylene glycol 3350 (MIRALAX) 17 gram/dose powder  Take 17 g by mouth once daily. Dissolve dose in 4 - 8 ounces of liquid and take as directed. 510 g      04/20/2025 05/20/2025 Active  senna-docusate (SENNA WITH DOCUSATE SODIUM) 8.6-50 mg per tablet  Take 1 tablet by mouth once daily. 30 tablet      04/20/2025 05/20/2025 ActivePsyllium Seed-Sucrose (METAMUCIL, SUGAR,)  Take 1 " Tablespoonful by mouth once daily. Take as directed. 575 g  2   04/20/2025   Active  oxyCODONE IR (ROXICODONE) 5 mg immediate release tablet   Indications: Diverticulitis Take 1 tablet by mouth every 8 hours as needed for pain for up to 3 days. 9 tablet      04/20/2025 04/23/2025 Active  cefdinir (OMNICEF) 300 mg capsule  Take 1 capsule by mouth tw two times a day for 7 days. (4/21/2025  3:49 PM)  Is the patient taking all medications as directed (includes completed medication regime)?: Yes (4/21/2025  3:49 PM)  Care Management Interventions: Provided patient education (4/21/2025  3:49 PM)  Medication Comments: Verbalizes understanding of medication changes (4/21/2025  3:49 PM)    Appointments  Does the patient have a primary care provider?: Yes (4/21/2025  3:49 PM)  Care Management Interventions: Verified appointment date/time/provider (4/21/2025  3:49 PM)  Has the patient kept scheduled appointments due by today?: Yes (4/21/2025  3:49 PM)  Care Management Interventions: Advised patient to keep appointment; Educated on importance of keeping appointment (4/21/2025  3:49 PM)    Self Management  What is the home health agency?: n/a (4/21/2025  3:49 PM)  What Durable Medical Equipment (DME) was ordered?: n/a (4/21/2025  3:49 PM)    Patient Teaching  Does the patient have access to their discharge instructions?: Yes (4/21/2025  3:49 PM)  Care Management Interventions: Reviewed instructions with patient (4/21/2025  3:49 PM)  What is the patient's perception of their health status since discharge?: Improving (4/21/2025  3:49 PM)  Is the patient/caregiver able to teach back the hierarchy of who to call/visit for symptoms/problems? PCP, Specialist, Home Health nurse, Urgent Care, ED, 911: Yes (4/21/2025  3:49 PM)  Patient/Caregiver Education Comments: See wrap up (4/21/2025  3:49 PM)

## 2025-04-22 ENCOUNTER — APPOINTMENT (OUTPATIENT)
Dept: PRIMARY CARE | Facility: CLINIC | Age: 50
End: 2025-04-22
Payer: COMMERCIAL

## 2025-04-22 VITALS
OXYGEN SATURATION: 95 % | WEIGHT: 182 LBS | SYSTOLIC BLOOD PRESSURE: 112 MMHG | DIASTOLIC BLOOD PRESSURE: 79 MMHG | HEART RATE: 83 BPM | BODY MASS INDEX: 30.29 KG/M2

## 2025-04-22 DIAGNOSIS — K29.70 GASTRITIS WITHOUT BLEEDING, UNSPECIFIED CHRONICITY, UNSPECIFIED GASTRITIS TYPE: ICD-10-CM

## 2025-04-22 DIAGNOSIS — G43.909 EPISODIC MIGRAINE: ICD-10-CM

## 2025-04-22 DIAGNOSIS — G89.4 CHRONIC PAIN SYNDROME: ICD-10-CM

## 2025-04-22 DIAGNOSIS — R51.9 CHRONIC NONINTRACTABLE HEADACHE, UNSPECIFIED HEADACHE TYPE: ICD-10-CM

## 2025-04-22 DIAGNOSIS — Z09 HOSPITAL DISCHARGE FOLLOW-UP: Primary | ICD-10-CM

## 2025-04-22 DIAGNOSIS — G89.29 CHRONIC NONINTRACTABLE HEADACHE, UNSPECIFIED HEADACHE TYPE: ICD-10-CM

## 2025-04-22 PROCEDURE — 99496 TRANSJ CARE MGMT HIGH F2F 7D: CPT | Performed by: NURSE PRACTITIONER

## 2025-04-22 PROCEDURE — 1036F TOBACCO NON-USER: CPT | Performed by: NURSE PRACTITIONER

## 2025-04-22 PROCEDURE — 99214 OFFICE O/P EST MOD 30 MIN: CPT | Performed by: NURSE PRACTITIONER

## 2025-04-22 RX ORDER — AMITRIPTYLINE HYDROCHLORIDE 10 MG/1
10 TABLET, FILM COATED ORAL NIGHTLY
Qty: 90 TABLET | Refills: 0 | Status: SHIPPED | OUTPATIENT
Start: 2025-04-22 | End: 2025-07-21

## 2025-04-22 ASSESSMENT — ENCOUNTER SYMPTOMS: DEPRESSION: 0

## 2025-04-22 NOTE — PROGRESS NOTES
Subjective   Patient ID: Quin Antoine is a 50 y.o. female who presents for Follow-up (Hospital follow up /Bump on rt arm from iv /Pt wants something for real bad headaches ).    HPI   [place] [facility] [place] [facility] [place]  Patient was seen in ER 4/13/25 with abdominal pain and found to have acute uncomplicated sigmoid diverticulitis. Treated with 7 day course of Omnicef/Flagyl. Presented to ER  with continued abdominal pain. Labs unremarkable.  CT A/P with acute uncomplicated sigmoid diverticulitis with improving pericolonic inflammatory changes noted with   Improving after a few doses of IV abx. Was sent home with 1 week of oral abx. Rx miralax/senna/docusate/psyllium for constipation. F/u with GI outpt for colonoscopy.  She state she feels ok; just whole body pains  No blood in stool   Still following liquid diet   She has a pending colonoscopy in the system.   --> I have placed referral for EGD as well   We have discussed follow up with both     Does report worsening headaches and whole  body pains   Hx of migraines living on Excedrin   We disuccused to avoid this   I have given a sample of Nutrec and we will start Low dose elavil at bedtime to help chronic headaches and chronic pain   We will titrate up as tolerated     Review of Systems    Objective   /79 (BP Location: Right arm, Patient Position: Sitting, BP Cuff Size: Adult)   Pulse 83   Wt 82.6 kg (182 lb)   LMP  (LMP Unknown)   SpO2 95%   BMI 30.29 kg/m²     Physical Exam    Assessment/Plan   1. Hospital discharge follow-up (Primary)  Continue to FU with GI for colonoscopy   Continue meds as ordered miralax/senna/docusate/psyllium for constipation     2. Gastritis without bleeding, unspecified chronicity, unspecified gastritis type  - Esophagogastroduodenoscopy (EGD); Future    3. Chronic nonintractable headache, unspecified headache type  - amitriptyline (Elavil) 10 mg tablet; Take 1 tablet (10 mg) by mouth once  daily at bedtime.  Dispense: 90 tablet; Refill: 0    4. Episodic migraine  - rimegepant (NURTEC) 75 mg tablet,disintegrating; Dissolve 1 tablet (75 mg) in the mouth every other day. LOT: [id number]  Exp   Dispense: 2 tablet; Refill: 0    5. Chronic pain syndrome  - amitriptyline (Elavil) 10 mg tablet; Take 1 tablet (10 mg) by mouth once daily at bedtime.  Dispense: 90 tablet; Refill: 0         Fu as needed

## 2025-04-22 NOTE — LETTER
April 22, 2025     Patient: Quin Antoine   YOB: 1975   Date of Visit: 4/22/2025       To Whom It May Concern:    Quin Antoine was seen in my clinic on 4/22/2025 at 8:45 am. Please excuse Quin for her absence from work on this day to make the appointment  Of note; please excuse due to acute medical illness with hospitalization  From dates: April 13- April 19, 2025      If you have any questions or concerns, please don't hesitate to call.         Sincerely,         Lazara Anton, APRN-CNP        CC: No Recipients

## 2025-04-25 ENCOUNTER — TELEPHONE (OUTPATIENT)
Dept: PRIMARY CARE | Facility: CLINIC | Age: 50
End: 2025-04-25
Payer: COMMERCIAL

## 2025-04-30 ENCOUNTER — OFFICE (OUTPATIENT)
Dept: URBAN - METROPOLITAN AREA CLINIC 26 | Facility: CLINIC | Age: 50
End: 2025-04-30
Payer: COMMERCIAL

## 2025-04-30 VITALS — WEIGHT: 184 LBS | HEIGHT: 65 IN

## 2025-04-30 DIAGNOSIS — Z80.0 FAMILY HISTORY OF MALIGNANT NEOPLASM OF DIGESTIVE ORGANS: ICD-10-CM

## 2025-04-30 DIAGNOSIS — K57.92 DIVERTICULITIS OF INTESTINE, PART UNSPECIFIED, WITHOUT PERFO: ICD-10-CM

## 2025-04-30 PROCEDURE — 99214 OFFICE O/P EST MOD 30 MIN: CPT | Performed by: INTERNAL MEDICINE

## 2025-05-07 ENCOUNTER — PATIENT OUTREACH (OUTPATIENT)
Dept: PRIMARY CARE | Facility: CLINIC | Age: 50
End: 2025-05-07
Payer: COMMERCIAL

## 2025-05-14 ENCOUNTER — OFFICE (OUTPATIENT)
Dept: URBAN - METROPOLITAN AREA CLINIC 26 | Facility: CLINIC | Age: 50
End: 2025-05-14
Payer: COMMERCIAL

## 2025-05-14 VITALS
HEART RATE: 77 BPM | TEMPERATURE: 98.1 F | SYSTOLIC BLOOD PRESSURE: 134 MMHG | WEIGHT: 184 LBS | HEIGHT: 65 IN | DIASTOLIC BLOOD PRESSURE: 88 MMHG

## 2025-05-14 DIAGNOSIS — K57.92 DIVERTICULITIS OF INTESTINE, PART UNSPECIFIED, WITHOUT PERFO: ICD-10-CM

## 2025-05-14 DIAGNOSIS — R53.83 OTHER FATIGUE: ICD-10-CM

## 2025-05-14 DIAGNOSIS — R10.84 GENERALIZED ABDOMINAL PAIN: ICD-10-CM

## 2025-05-14 DIAGNOSIS — R53.1 WEAKNESS: ICD-10-CM

## 2025-05-14 DIAGNOSIS — Z80.0 FAMILY HISTORY OF MALIGNANT NEOPLASM OF DIGESTIVE ORGANS: ICD-10-CM

## 2025-05-14 PROCEDURE — 99214 OFFICE O/P EST MOD 30 MIN: CPT

## 2025-05-21 ENCOUNTER — TELEPHONE (OUTPATIENT)
Dept: PRIMARY CARE | Facility: CLINIC | Age: 50
End: 2025-05-21
Payer: COMMERCIAL